# Patient Record
Sex: MALE | Race: ASIAN | NOT HISPANIC OR LATINO | URBAN - METROPOLITAN AREA
[De-identification: names, ages, dates, MRNs, and addresses within clinical notes are randomized per-mention and may not be internally consistent; named-entity substitution may affect disease eponyms.]

---

## 2018-10-01 ENCOUNTER — OUTPATIENT (OUTPATIENT)
Dept: OUTPATIENT SERVICES | Facility: HOSPITAL | Age: 83
LOS: 1 days | End: 2018-10-01
Payer: MEDICARE

## 2018-10-01 DIAGNOSIS — Z95.1 PRESENCE OF AORTOCORONARY BYPASS GRAFT: Chronic | ICD-10-CM

## 2018-10-01 PROCEDURE — G9001: CPT

## 2018-10-25 ENCOUNTER — INPATIENT (INPATIENT)
Facility: HOSPITAL | Age: 83
LOS: 5 days | Discharge: HOSPICE | End: 2018-10-31
Attending: INTERNAL MEDICINE | Admitting: INTERNAL MEDICINE
Payer: MEDICARE

## 2018-10-25 VITALS
RESPIRATION RATE: 15 BRPM | TEMPERATURE: 98 F | HEIGHT: 60 IN | WEIGHT: 119.93 LBS | HEART RATE: 50 BPM | DIASTOLIC BLOOD PRESSURE: 92 MMHG | OXYGEN SATURATION: 90 % | SYSTOLIC BLOOD PRESSURE: 120 MMHG

## 2018-10-25 DIAGNOSIS — E78.5 HYPERLIPIDEMIA, UNSPECIFIED: ICD-10-CM

## 2018-10-25 DIAGNOSIS — E11.9 TYPE 2 DIABETES MELLITUS WITHOUT COMPLICATIONS: ICD-10-CM

## 2018-10-25 DIAGNOSIS — R62.7 ADULT FAILURE TO THRIVE: ICD-10-CM

## 2018-10-25 DIAGNOSIS — I10 ESSENTIAL (PRIMARY) HYPERTENSION: ICD-10-CM

## 2018-10-25 DIAGNOSIS — G20 PARKINSON'S DISEASE: ICD-10-CM

## 2018-10-25 DIAGNOSIS — Z95.1 PRESENCE OF AORTOCORONARY BYPASS GRAFT: Chronic | ICD-10-CM

## 2018-10-25 DIAGNOSIS — Z71.89 OTHER SPECIFIED COUNSELING: ICD-10-CM

## 2018-10-25 DIAGNOSIS — Z51.5 ENCOUNTER FOR PALLIATIVE CARE: ICD-10-CM

## 2018-10-25 LAB
ALBUMIN SERPL ELPH-MCNC: 3.7 G/DL — SIGNIFICANT CHANGE UP (ref 3.3–5)
ALP SERPL-CCNC: 62 U/L — SIGNIFICANT CHANGE UP (ref 40–120)
ALT FLD-CCNC: 38 U/L — SIGNIFICANT CHANGE UP (ref 12–78)
ANION GAP SERPL CALC-SCNC: 9 MMOL/L — SIGNIFICANT CHANGE UP (ref 5–17)
APPEARANCE UR: CLEAR — SIGNIFICANT CHANGE UP
APTT BLD: 28.8 SEC — SIGNIFICANT CHANGE UP (ref 27.5–37.4)
AST SERPL-CCNC: 42 U/L — HIGH (ref 15–37)
BACTERIA # UR AUTO: ABNORMAL
BASOPHILS # BLD AUTO: 0.02 K/UL — SIGNIFICANT CHANGE UP (ref 0–0.2)
BASOPHILS NFR BLD AUTO: 0.2 % — SIGNIFICANT CHANGE UP (ref 0–2)
BILIRUB SERPL-MCNC: 1.4 MG/DL — HIGH (ref 0.2–1.2)
BILIRUB UR-MCNC: NEGATIVE — SIGNIFICANT CHANGE UP
BUN SERPL-MCNC: 29 MG/DL — HIGH (ref 7–23)
CALCIUM SERPL-MCNC: 8.9 MG/DL — SIGNIFICANT CHANGE UP (ref 8.5–10.1)
CHLORIDE SERPL-SCNC: 103 MMOL/L — SIGNIFICANT CHANGE UP (ref 96–108)
CO2 SERPL-SCNC: 28 MMOL/L — SIGNIFICANT CHANGE UP (ref 22–31)
COLOR SPEC: YELLOW — SIGNIFICANT CHANGE UP
COMMENT - URINE: SIGNIFICANT CHANGE UP
CREAT SERPL-MCNC: 0.83 MG/DL — SIGNIFICANT CHANGE UP (ref 0.5–1.3)
DIFF PNL FLD: ABNORMAL
EOSINOPHIL # BLD AUTO: 0.11 K/UL — SIGNIFICANT CHANGE UP (ref 0–0.5)
EOSINOPHIL NFR BLD AUTO: 1 % — SIGNIFICANT CHANGE UP (ref 0–6)
EPI CELLS # UR: SIGNIFICANT CHANGE UP
GLUCOSE BLDC GLUCOMTR-MCNC: 129 MG/DL — HIGH (ref 70–99)
GLUCOSE BLDC GLUCOMTR-MCNC: 94 MG/DL — SIGNIFICANT CHANGE UP (ref 70–99)
GLUCOSE SERPL-MCNC: 127 MG/DL — HIGH (ref 70–99)
GLUCOSE UR QL: 50 MG/DL
HCT VFR BLD CALC: 38.3 % — LOW (ref 39–50)
HGB BLD-MCNC: 12.9 G/DL — LOW (ref 13–17)
HYALINE CASTS # UR AUTO: ABNORMAL /LPF
IMM GRANULOCYTES NFR BLD AUTO: 0.3 % — SIGNIFICANT CHANGE UP (ref 0–1.5)
INR BLD: 1.25 RATIO — HIGH (ref 0.88–1.16)
KETONES UR-MCNC: ABNORMAL
LEUKOCYTE ESTERASE UR-ACNC: ABNORMAL
LYMPHOCYTES # BLD AUTO: 1.49 K/UL — SIGNIFICANT CHANGE UP (ref 1–3.3)
LYMPHOCYTES # BLD AUTO: 14.2 % — SIGNIFICANT CHANGE UP (ref 13–44)
MCHC RBC-ENTMCNC: 32.2 PG — SIGNIFICANT CHANGE UP (ref 27–34)
MCHC RBC-ENTMCNC: 33.7 GM/DL — SIGNIFICANT CHANGE UP (ref 32–36)
MCV RBC AUTO: 95.5 FL — SIGNIFICANT CHANGE UP (ref 80–100)
MONOCYTES # BLD AUTO: 0.68 K/UL — SIGNIFICANT CHANGE UP (ref 0–0.9)
MONOCYTES NFR BLD AUTO: 6.5 % — SIGNIFICANT CHANGE UP (ref 2–14)
NEUTROPHILS # BLD AUTO: 8.16 K/UL — HIGH (ref 1.8–7.4)
NEUTROPHILS NFR BLD AUTO: 77.8 % — HIGH (ref 43–77)
NITRITE UR-MCNC: NEGATIVE — SIGNIFICANT CHANGE UP
NRBC # BLD: 0 /100 WBCS — SIGNIFICANT CHANGE UP (ref 0–0)
PH UR: 6 — SIGNIFICANT CHANGE UP (ref 5–8)
PLATELET # BLD AUTO: 287 K/UL — SIGNIFICANT CHANGE UP (ref 150–400)
POTASSIUM SERPL-MCNC: 4.2 MMOL/L — SIGNIFICANT CHANGE UP (ref 3.5–5.3)
POTASSIUM SERPL-SCNC: 4.2 MMOL/L — SIGNIFICANT CHANGE UP (ref 3.5–5.3)
PROT SERPL-MCNC: 8.8 GM/DL — HIGH (ref 6–8.3)
PROT UR-MCNC: 30 MG/DL
PROTHROM AB SERPL-ACNC: 13.7 SEC — HIGH (ref 9.8–12.7)
RBC # BLD: 4.01 M/UL — LOW (ref 4.2–5.8)
RBC # FLD: 14.3 % — SIGNIFICANT CHANGE UP (ref 10.3–14.5)
RBC CASTS # UR COMP ASSIST: ABNORMAL /HPF (ref 0–4)
SODIUM SERPL-SCNC: 140 MMOL/L — SIGNIFICANT CHANGE UP (ref 135–145)
SP GR SPEC: 1.02 — SIGNIFICANT CHANGE UP (ref 1.01–1.02)
TSH SERPL-MCNC: 1.22 UU/ML — SIGNIFICANT CHANGE UP (ref 0.36–3.74)
UROBILINOGEN FLD QL: 1 MG/DL
WBC # BLD: 10.49 K/UL — SIGNIFICANT CHANGE UP (ref 3.8–10.5)
WBC # FLD AUTO: 10.49 K/UL — SIGNIFICANT CHANGE UP (ref 3.8–10.5)
WBC UR QL: SIGNIFICANT CHANGE UP

## 2018-10-25 PROCEDURE — 99498 ADVNCD CARE PLAN ADDL 30 MIN: CPT

## 2018-10-25 PROCEDURE — 99223 1ST HOSP IP/OBS HIGH 75: CPT | Mod: AI

## 2018-10-25 PROCEDURE — 71045 X-RAY EXAM CHEST 1 VIEW: CPT | Mod: 26

## 2018-10-25 PROCEDURE — 99497 ADVNCD CARE PLAN 30 MIN: CPT

## 2018-10-25 PROCEDURE — 70450 CT HEAD/BRAIN W/O DYE: CPT | Mod: 26

## 2018-10-25 PROCEDURE — 93010 ELECTROCARDIOGRAM REPORT: CPT

## 2018-10-25 PROCEDURE — 99285 EMERGENCY DEPT VISIT HI MDM: CPT

## 2018-10-25 RX ORDER — METOPROLOL TARTRATE 50 MG
5 TABLET ORAL ONCE
Qty: 0 | Refills: 0 | Status: COMPLETED | OUTPATIENT
Start: 2018-10-25 | End: 2018-10-25

## 2018-10-25 RX ORDER — ENOXAPARIN SODIUM 100 MG/ML
40 INJECTION SUBCUTANEOUS EVERY 24 HOURS
Qty: 0 | Refills: 0 | Status: DISCONTINUED | OUTPATIENT
Start: 2018-10-25 | End: 2018-10-31

## 2018-10-25 RX ORDER — DEXTROSE 50 % IN WATER 50 %
15 SYRINGE (ML) INTRAVENOUS ONCE
Qty: 0 | Refills: 0 | Status: DISCONTINUED | OUTPATIENT
Start: 2018-10-25 | End: 2018-10-31

## 2018-10-25 RX ORDER — INSULIN LISPRO 100/ML
VIAL (ML) SUBCUTANEOUS
Qty: 0 | Refills: 0 | Status: DISCONTINUED | OUTPATIENT
Start: 2018-10-25 | End: 2018-10-30

## 2018-10-25 RX ORDER — DEXTROSE 50 % IN WATER 50 %
25 SYRINGE (ML) INTRAVENOUS ONCE
Qty: 0 | Refills: 0 | Status: DISCONTINUED | OUTPATIENT
Start: 2018-10-25 | End: 2018-10-31

## 2018-10-25 RX ORDER — SODIUM CHLORIDE 9 MG/ML
500 INJECTION INTRAMUSCULAR; INTRAVENOUS; SUBCUTANEOUS ONCE
Qty: 0 | Refills: 0 | Status: COMPLETED | OUTPATIENT
Start: 2018-10-25 | End: 2018-10-25

## 2018-10-25 RX ORDER — SODIUM CHLORIDE 9 MG/ML
1000 INJECTION, SOLUTION INTRAVENOUS
Qty: 0 | Refills: 0 | Status: DISCONTINUED | OUTPATIENT
Start: 2018-10-25 | End: 2018-10-31

## 2018-10-25 RX ORDER — METOPROLOL TARTRATE 50 MG
5 TABLET ORAL ONCE
Qty: 0 | Refills: 0 | Status: DISCONTINUED | OUTPATIENT
Start: 2018-10-25 | End: 2018-10-25

## 2018-10-25 RX ORDER — SODIUM CHLORIDE 9 MG/ML
1000 INJECTION, SOLUTION INTRAVENOUS
Qty: 0 | Refills: 0 | Status: DISCONTINUED | OUTPATIENT
Start: 2018-10-25 | End: 2018-10-26

## 2018-10-25 RX ORDER — GLUCAGON INJECTION, SOLUTION 0.5 MG/.1ML
1 INJECTION, SOLUTION SUBCUTANEOUS ONCE
Qty: 0 | Refills: 0 | Status: DISCONTINUED | OUTPATIENT
Start: 2018-10-25 | End: 2018-10-31

## 2018-10-25 RX ORDER — METOPROLOL TARTRATE 50 MG
5 TABLET ORAL EVERY 6 HOURS
Qty: 0 | Refills: 0 | Status: DISCONTINUED | OUTPATIENT
Start: 2018-10-25 | End: 2018-10-31

## 2018-10-25 RX ORDER — INFLUENZA VIRUS VACCINE 15; 15; 15; 15 UG/.5ML; UG/.5ML; UG/.5ML; UG/.5ML
0.5 SUSPENSION INTRAMUSCULAR ONCE
Qty: 0 | Refills: 0 | Status: COMPLETED | OUTPATIENT
Start: 2018-10-25 | End: 2018-10-31

## 2018-10-25 RX ORDER — SODIUM CHLORIDE 9 MG/ML
1000 INJECTION INTRAMUSCULAR; INTRAVENOUS; SUBCUTANEOUS
Qty: 0 | Refills: 0 | Status: DISCONTINUED | OUTPATIENT
Start: 2018-10-25 | End: 2018-10-25

## 2018-10-25 RX ORDER — DEXTROSE 50 % IN WATER 50 %
12.5 SYRINGE (ML) INTRAVENOUS ONCE
Qty: 0 | Refills: 0 | Status: DISCONTINUED | OUTPATIENT
Start: 2018-10-25 | End: 2018-10-31

## 2018-10-25 RX ORDER — INSULIN LISPRO 100/ML
VIAL (ML) SUBCUTANEOUS AT BEDTIME
Qty: 0 | Refills: 0 | Status: DISCONTINUED | OUTPATIENT
Start: 2018-10-25 | End: 2018-10-30

## 2018-10-25 RX ADMIN — ENOXAPARIN SODIUM 40 MILLIGRAM(S): 100 INJECTION SUBCUTANEOUS at 16:19

## 2018-10-25 RX ADMIN — SODIUM CHLORIDE 125 MILLILITER(S): 9 INJECTION INTRAMUSCULAR; INTRAVENOUS; SUBCUTANEOUS at 11:30

## 2018-10-25 RX ADMIN — SODIUM CHLORIDE 100 MILLILITER(S): 9 INJECTION, SOLUTION INTRAVENOUS at 16:19

## 2018-10-25 RX ADMIN — SODIUM CHLORIDE 16.67 MILLILITER(S): 9 INJECTION INTRAMUSCULAR; INTRAVENOUS; SUBCUTANEOUS at 11:16

## 2018-10-25 RX ADMIN — Medication 5 MILLIGRAM(S): at 13:59

## 2018-10-25 RX ADMIN — SODIUM CHLORIDE 500 MILLILITER(S): 9 INJECTION INTRAMUSCULAR; INTRAVENOUS; SUBCUTANEOUS at 11:20

## 2018-10-25 RX ADMIN — Medication 5 MILLIGRAM(S): at 16:19

## 2018-10-25 NOTE — CONSULT NOTE ADULT - CONSULT REASON
Goals of care conversation and advance care planning. Patient has Parkinson's disease and failure to thrive.

## 2018-10-25 NOTE — ED PROVIDER NOTE - MEDICAL DECISION MAKING DETAILS
Pt with failure to thrive will also benefit from medication adjustment as likely decompensating parkinsons causing FTT. d/w with family, no PEG tube, may consider hospice care. dr. aaliyah irizarry.

## 2018-10-25 NOTE — H&P ADULT - NSHPLABSRESULTS_GEN_ALL_CORE
< from: CT Head No Cont (10.25.18 @ 12:38) >    IMPRESSION:    1)  scattered chronic ischemic changes with mild to moderate volume loss.   No acute abnormality suggested..  2)  chronic inflammatory changes noted in the sinuses in the left   temporal bone.                            12.9   10.49 )-----------( 287      ( 25 Oct 2018 11:13 )             38.3     10-25    140  |  103  |  29<H>  ----------------------------<  127<H>  4.2   |  28  |  0.83    Ca    8.9      25 Oct 2018 11:13    TPro  8.8<H>  /  Alb  3.7  /  TBili  1.4<H>  /  DBili  x   /  AST  42<H>  /  ALT  38  /  AlkPhos  62  10-25

## 2018-10-25 NOTE — ED ADULT NURSE NOTE - INTERVENTIONS DEFINITIONS
Monitor for mental status changes and reorient to person, place, and time/Stretcher in lowest position, wheels locked, appropriate side rails in place/Physically safe environment: no spills, clutter or unnecessary equipment

## 2018-10-25 NOTE — H&P ADULT - ASSESSMENT
83m with parkinsons who the family wants only comfort care and no ngt - he has not eaten in four days and requires admssion for hospice eval       IMPROVE VTE Individual Risk Assessment        RISK                                                          Points  [  ] Previous VTE                                                3  [  ] Thrombophilia                                             2  [  ] Lower limb paralysis                                   2        (unable to hold up >15 seconds)    [  ] Current Cancer                                            2         (within 6 months)  [  x] Immobilization > 24 hrs                              1  [  ] ICU/CCU stay > 24 hours                            1  [ x ] Age > 60                                                    1  IMPROVE VTE Score ___2______

## 2018-10-25 NOTE — H&P ADULT - HISTORY OF PRESENT ILLNESS
84yo male with pmh dementia, parkinsons, DM, HTN, HL, hypothyroid presents with failure to thrive. Pt has not been ambulating for 6 months, and slowly decompensating per family. For past 4 days unable to feed pt as he clenches his teeth. Pt barely speaks at baseline as difficult due to parkinsons but has not been talking at all. no fever, vomiting, pain, diarrhea, foul smelling urine, cough, sob, noted.

## 2018-10-25 NOTE — CONSULT NOTE ADULT - ASSESSMENT
Family meeting on: DATE: 10/25/18 Called  and as per the family member who responded stated that the family is on their way to the hospital. Will follow .  RECOMMENDATIONS: CONSIDER DNR/ DNI, COMFORT CARE, PAIN AND SYMPTOM MANAGEMENT WITH HOSPICE CARE. Family meeting on: DATE: 10/25/18 Called  and as per the family member who responded stated that the family is on their way to the hospital. 17:30 pm Met with wife Dianne Randolph and Daughter Thea Baltazar and discussed goals of care and advance care planning. As per daughter patient only wanted comfort measures only. She stated that she wanted to discuss with her family before make any decision. Hospice care explained and accepted but still wanted to discuss with the son before call for the evaluation.  RECOMMENDATIONS: CONSIDER DNR/ DNI, COMFORT CARE, PAIN AND SYMPTOM MANAGEMENT WITH HOSPICE CARE.

## 2018-10-25 NOTE — ED PROVIDER NOTE - OBJECTIVE STATEMENT
82yo male with pmh dementia, parkinsons, DM, HTN, HL, hypothyroid presents with failure to thrive. Pt has not been ambulating for 6 months, and slowly decompensating per family. For past 4 days unable to feed pt as he clenches his teeth. Pt barely speaks at baseline as difficult due to parkinsons but has not been talking at all. no fever, vomiting, pain, diarrhea, foul smelling urine, cough, sob, noted.    home doctor: dr joey goncalves, dr munson, neuro: dr anoop rojas

## 2018-10-25 NOTE — CONSULT NOTE ADULT - ASSESSMENT
Multiple medical problems as outlined  Progressive dementia, depression, Parkinson's disease  weight loss, moderate protein calorie malnutrition  No acute CV issues

## 2018-10-25 NOTE — H&P ADULT - PMH
CAD (coronary artery disease)    Depressed    DM (diabetes mellitus)    HLD (hyperlipidemia)    HTN (hypertension)    Macular degeneration

## 2018-10-25 NOTE — CONSULT NOTE ADULT - SUBJECTIVE AND OBJECTIVE BOX
83 year old male well known to me for many years with h/o CAD, s/p CABG. No h/o CHF or recent CV events.  Has had poorly controlled DM, hypertension, hyperlipidemia and hypothyroidism.    He developed PD and has had progressive dementia and severe depression, s/p ECT.  Currently with diminished oral intake and declining level of function/consciousness.      	    ALLERGIES AND HOME MEDICATIONS:   Allergies:        Allergies:  	No Known Allergies:     Home Medications:   * Patient Currently Takes Medications as of 17-Jun-2016 16:38 documented in Structured Notes  · 	enoxaparin: 40 unit(s) subcutaneously once a day  · 	carbidopa-levodopa 25 mg-250 mg oral tablet: 1 tab(s) orally 3 times a day  · 	bisacodyl 10 mg rectal suppository: 1 suppository(ies) rectal every 48 hours, As needed, Constipation  · 	LSO Brace: Dx: L1 Burst Fracture  	Length of need: 99 days  · 	levothyroxine 50 mcg (0.05 mg) oral tablet: 1 tab(s) orally once a day  · 	buPROPion 75 mg oral tablet: 1 tab(s) orally once a day  · 	Cymbalta 20 mg oral delayed release capsule: 1 cap(s) orally once a day  · 	Vitamin B Complex 100:  injectable once a day  · 	Januvia 100 mg oral tablet: 1 tab(s) orally once a day  · 	losartan 25 mg oral tablet: 1 tab(s) orally once a day  · 	Metoprolol Succinate ER 25 mg oral tablet, extended release: 1 tab(s) orally once a day  · 	Crestor 10 mg oral tablet: 1 tab(s) orally once a day (at bedtime)  · 	Ecotrin 325 mg oral delayed release tablet: 1 tab(s) orally once a day  · 	Ocuvite oral tablet: 1 tab(s) orally once a day  · 	glyBURIDE-metFORMIN 5 mg-500 mg oral tablet: 1 tab(s) orally 2 times a day  · 	tamsulosin 0.4 mg oral capsule: 1 cap(s) orally every other day  · 	finasteride 5 mg oral tablet: 1 tab(s) orally once a day    REVIEW OF SYSTEMS:   Review of Systems:  · UNABLE TO OBTAIN: Dementia	    PHYSICAL EXAM:   VS per EHR Skin warm, dry alerts and turns head to verbal stimuli noncommunicative skin turgor poor sclera anicter, conj nl jvp normal lungs clear no S3, rub, soft apical flow murmur, normal S1S2 abdomen benign no edema posturing noted	  LABS    Complete Blood Count + Automated Diff (10.25.18 @ 11:13)    WBC Count: 10.49 K/uL    RBC Count: 4.01 M/uL    Hemoglobin: 12.9 g/dL    Hematocrit: 38.3 %    Mean Cell Volume: 95.5 fl    Mean Cell Hemoglobin: 32.2 pg    Mean Cell Hemoglobin Conc: 33.7 gm/dL    Red Cell Distrib Width: 14.3 %    Platelet Count - Automated: 287 K/uL    Auto Neutrophil #: 8.16 K/uL    Auto Lymphocyte #: 1.49 K/uL    Auto Monocyte #: 0.68 K/uL    Auto Eosinophil #: 0.11 K/uL    Auto Basophil #: 0.02 K/uL    Auto Neutrophil %: 77.8: Differential percentages must be correlated with absolute numbers for  clinical significance. %    Auto Lymphocyte %: 14.2 %    Auto Monocyte %: 6.5 %    Auto Eosinophil %: 1.0 %    Auto Basophil %: 0.2 %    Auto Immature Granulocyte %: 0.3 %    Comprehensive Metabolic Panel (10.25.18 @ 11:13)    Sodium, Serum: 140 mmol/L    Potassium, Serum: 4.2 mmol/L    Chloride, Serum: 103 mmol/L    Carbon Dioxide, Serum: 28 mmol/L    Anion Gap, Serum: 9 mmol/L    Blood Urea Nitrogen, Serum: 29 mg/dL    Creatinine, Serum: 0.83 mg/dL    Glucose, Serum: 127 mg/dL    Calcium, Total Serum: 8.9 mg/dL    Protein Total, Serum: 8.8 gm/dL    Albumin, Serum: 3.7 g/dL    Bilirubin Total, Serum: 1.4 mg/dL    Alkaline Phosphatase, Serum: 62 U/L    Aspartate Aminotransferase (AST/SGOT): 42 U/L    Alanine Aminotransferase (ALT/SGPT): 38 U/L    eGFR if Non : 82: Interpretative comment  The units for eGFR are ml/min/1.73m2 (normalized body surface area). The  eGFR is calculated from a serum creatinine using the CKD-EPI equation.  Other variables required for calculation are race, age and sex. Among  patients with chronic kidney disease (CKD), the eGFR is useful in  determining the stage of disease according to KDOQI CKD classification.  All eGFR results are reported numerically with the following  interpretation.          GFR                    With                 Without     (ml/min/1.73 m2)    Kidney Damage       Kidney Damage        >= 90                    Stage 1                     Normal        60-89                    Stage 2                     Decreased GFR        30-59     Stage 3                     Stage 3        15-29                    Stage 4                     Stage 4        < 15                      Stage 5                     Stage 5  Each stage of CKD assumes that the associated GFR level has been in  effect for at least 3 months. Determination of stages one and two (with  eGFR > 59 ml/min/m2) requires estimation of kidney damage for at least 3  months as defined by structural or functional abnormalities.  Limitations: All estimates of GFR will be less accurate for patients at  extremes of muscle mass (including but not limited to frail elderly,  critically ill, or cancer patients), those with unusual diets, and those  with conditions associated with reduced secretion or extrarenal  elimination of creatinine. The eGFR equation is not recommended for use  in patients with unstable creatinine levels. mL/min/1.73M2    eGFR if African American: 95 mL/min/1.73M2

## 2018-10-25 NOTE — ED ADULT TRIAGE NOTE - CHIEF COMPLAINT QUOTE
ems states, " Family states he has not been eating or drinking for 3 days, he has parkinson's and dementia ", sent from [facility]

## 2018-10-25 NOTE — CONSULT NOTE ADULT - SUBJECTIVE AND OBJECTIVE BOX
HPI:  82yo male with pmh dementia, parkinsons, DM, HTN, HL, hypothyroid presents with failure to thrive. Pt has not been ambulating for 6 months, and slowly decompensating per family. For past 4 days unable to feed pt as he clenches his teeth. Pt barely speaks at baseline as difficult due to parkinsons but has not been talking at all. no fever, vomiting, pain, diarrhea, foul smelling urine, cough, sob, noted. Palliative care consult called for Goals of care conversation and advance care planning. Patient has Parkinson's disease and failure to thrive.    PERTINENT PMH REVIEWED:  [x ] YES [ ] NO           SOCIAL HISTORY:  Significant other/partner:  [ ] YES  [ ] NO            Children:  [x ] YES  [ ] NO                   Caodaism/Spirituality:  Substance hx:  [ ] YES   [ ] NO           Tobacco hx:  [ ] YES  [ ] NO             Alcohol hx: [ ] YES  [ ] NO        Home Opioid hx:  [ ] YES  [ ] NO   Living Situation: [x ] Home  [ ] Long term care  [ ] Rehab    FAMILY HISTORY:  No pertinent family history in first degree relatives    [x ] Family history non contributory     BASELINE ADLs (prior to admission):  Independent [ ] moderately [ ] fully   Dependent   [ ] moderately [x ] fully    Code Status:                      MOLST  [ ] YES [ ] NO    Living Will  [ ] YES [ ] NO    Health Care Proxy [ ] YES  [ ] NO      [ ] Surrogate  [ ] HCP  [ ] Guardian:                                                                  Phone#:    Allergies    No Known Allergies    Intolerances        MEDICATIONS  (STANDING):  dextrose 5% + sodium chloride 0.9%. 1000 milliLiter(s) (100 mL/Hr) IV Continuous <Continuous>  dextrose 5%. 1000 milliLiter(s) (50 mL/Hr) IV Continuous <Continuous>  dextrose 50% Injectable 12.5 Gram(s) IV Push once  dextrose 50% Injectable 25 Gram(s) IV Push once  dextrose 50% Injectable 25 Gram(s) IV Push once  enoxaparin Injectable 40 milliGRAM(s) SubCutaneous every 24 hours  influenza   Vaccine 0.5 milliLiter(s) IntraMuscular once  insulin lispro (HumaLOG) corrective regimen sliding scale   SubCutaneous three times a day before meals  insulin lispro (HumaLOG) corrective regimen sliding scale   SubCutaneous at bedtime  metoprolol tartrate Injectable 5 milliGRAM(s) IV Push every 6 hours    MEDICATIONS  (PRN):  dextrose 40% Gel 15 Gram(s) Oral once PRN Blood Glucose LESS THAN 70 milliGRAM(s)/deciliter  glucagon  Injectable 1 milliGRAM(s) IntraMuscular once PRN Glucose LESS THAN 70 milligrams/deciliter      PRESENT SYMPTOMS:  Source: [ ] Patient   [ ] Family   [ ] Team     Pain: [ ] YES [ ] NO  Onset:                    Location:                          Duration:                 Character:            Aggravating factors:                        Relieving factors:    Radiation:              Timing:                             Severity:      Dyspnea: [ ] YES [ ] NO - Mild [ ]  Moderate [ ]  Severe [ ]    Anxiety: [ ] YES [ ] NO  Fatigue: [ ] YES [ ] NO   Nausea: [ ] YES [ ] NO  Loss of appetite: [ ] YES [ ] NO   Constipation: [ ] YES [ ] NO     Other Symptoms:  [ ] All other review of systems negative   [ ] Unable to obtain due to poor mentation     Does patient meet criteria for Severe Protein Calorie Malnutrition?  Yes [ ]  No [ ]  PPSV 30% or below [ ]  Anasarca [ ]  Albumin < 2 [ ] Catabolic State [ ] Poor nutritional intake [ ] Significant weight loss [ ]      Palliative Performance Status Version 2:         %  ECOG -        Vital Signs Last 24 Hrs  T(C): 36.7 (25 Oct 2018 15:48), Max: 37.1 (25 Oct 2018 14:39)  T(F): 98.1 (25 Oct 2018 15:48), Max: 98.8 (25 Oct 2018 14:39)  HR: 87 (25 Oct 2018 15:48) (50 - 99)  BP: 192/101 (25 Oct 2018 15:48) (120/92 - 192/101)  BP(mean): --  RR: 19 (25 Oct 2018 15:48) (15 - 22)  SpO2: 98% (25 Oct 2018 15:48) (90% - 100%)    Physical Exam:    General: [ ] Alert,  A&O x     [ ] lethargic   [ ] Agitated   [ ] Cachexia   HEENT: [ ] Normal   [ ] Dry mouth   [ ] ET Tube    [ ] Trach   Lungs: [ ] Clear [ ] Rhonchi  [ ] Crackles [ ] Wheezing [ ] Tachypnea  [ ] Audible excessive secretions    Cardiovascular:  [ ] Regular rate and rhythm  [ ] Irregular [ ] Tachycardia   [ ] Bradycardia   Abdomen: [ ] Soft  [ ] Distended  [ ] +BS  [ ] Non tender [ ] Tender  [ ]PEG   [ ] NGT   Last BM:     Genitourinary: [ ] Normal [ ] Incontinent   [ ] Oliguria/Anuria   [ ] Jarvis  Musculoskeletal:  [ ] Normal   [ ] Generalized weakness  [ ] Bedbound  [ ] Edema   Neurological: [ ] No focal deficits  [ ] Cognitive impairment     Skin: [ ] Normal   [ ] Pressure ulcers     LABS:                        12.9   10.49 )-----------( 287      ( 25 Oct 2018 11:13 )             38.3     10    140  |  103  |  29<H>  ----------------------------<  127<H>  4.2   |  28  |  0.83    Ca    8.9      25 Oct 2018 11:13    TPro  8.8<H>  /  Alb  3.7  /  TBili  1.4<H>  /  DBili  x   /  AST  42<H>  /  ALT  38  /  AlkPhos  62  10    PT/INR - ( 25 Oct 2018 11:48 )   PT: 13.7 sec;   INR: 1.25 ratio         PTT - ( 25 Oct 2018 11:48 )  PTT:28.8 sec  Urinalysis Basic - ( 25 Oct 2018 11:32 )    Color: Yellow / Appearance: Clear / S.020 / pH: x  Gluc: x / Ketone: Moderate  / Bili: Negative / Urobili: 1 mg/dL   Blood: x / Protein: 30 mg/dL / Nitrite: Negative   Leuk Esterase: Trace / RBC: 3-5 /HPF / WBC 0-2   Sq Epi: x / Non Sq Epi: Occasional / Bacteria: Occasional      I&O's Summary      RADIOLOGY & ADDITIONAL STUDIES:    Referrals:  Hospice [ ]   Chaplaincy [ ]    Child Life [ ]   Social Work [ ]   Case Management [ ]   Holistic Therapy [ ] HPI:  82yo male with pmh dementia, parkinsons, DM, HTN, HL, hypothyroid presents with failure to thrive. Pt has not been ambulating for 6 months, and slowly decompensating per family. For past 4 days unable to feed pt as he clenches his teeth. Pt barely speaks at baseline as difficult due to parkinsons but has not been talking at all. no fever, vomiting, pain, diarrhea, foul smelling urine, cough, sob, noted. Palliative care consult called for Goals of care conversation and advance care planning. Patient has Parkinson's disease and failure to thrive.    PERTINENT PMH REVIEWED:  [x ] YES [ ] NO           SOCIAL HISTORY:  Significant other/partner:  [ ] YES  [ ] NO            Children:  [x ] YES  [ ] NO                   Denominational/Spirituality:  Substance hx:  [ ] YES   [ ] NO           Tobacco hx:  [ ] YES  [ ] NO             Alcohol hx: [ ] YES  [ ] NO        Home Opioid hx:  [ ] YES  [ ] NO   Living Situation: [x ] Home  [ ] Long term care  [ ] Rehab    FAMILY HISTORY:  No pertinent family history in first degree relatives    [x ] Family history non contributory     BASELINE ADLs (prior to admission):  Independent [ ] moderately [ ] fully   Dependent   [ ] moderately [x ] fully    Code Status: FULL CODE                     MOLST  [ ] YES [X ] NO    Living Will  [ ] YES [X ] NO    Health Care Proxy [ ] YES  [X ] NO      [ ] Surrogate  [ ] HCP  [ ] Guardian:  Tomy Randolph (son) / Thea Baltazar (daughter)    Dianne Randolph (wife/ HCP)[phone]                                                                    Allergies  No Known Allergies    Intolerances    MEDICATIONS  (STANDING):  dextrose 5% + sodium chloride 0.9%. 1000 milliLiter(s) (100 mL/Hr) IV Continuous <Continuous>  dextrose 5%. 1000 milliLiter(s) (50 mL/Hr) IV Continuous <Continuous>  dextrose 50% Injectable 12.5 Gram(s) IV Push once  dextrose 50% Injectable 25 Gram(s) IV Push once  dextrose 50% Injectable 25 Gram(s) IV Push once  enoxaparin Injectable 40 milliGRAM(s) SubCutaneous every 24 hours  influenza   Vaccine 0.5 milliLiter(s) IntraMuscular once  insulin lispro (HumaLOG) corrective regimen sliding scale   SubCutaneous three times a day before meals  insulin lispro (HumaLOG) corrective regimen sliding scale   SubCutaneous at bedtime  metoprolol tartrate Injectable 5 milliGRAM(s) IV Push every 6 hours    MEDICATIONS  (PRN):  dextrose 40% Gel 15 Gram(s) Oral once PRN Blood Glucose LESS THAN 70 milliGRAM(s)/deciliter  glucagon  Injectable 1 milliGRAM(s) IntraMuscular once PRN Glucose LESS THAN 70 milligrams/deciliter    PRESENT SYMPTOMS:  Source: [ ] Patient   [X ] Family   [ ] Team     Pain: [ ] YES [X] NO  Onset:                    Location:                          Duration:                 Character:            Aggravating factors:                        Relieving factors:    Radiation:              Timing:                             Severity:      Dyspnea: [ ] YES [X ] NO - Mild [ ]  Moderate [ ]  Severe [ ]    Anxiety: [ ] YES [X ] NO  Fatigue: [X ] YES [ ] NO   Nausea: [ ] YES [X ] NO  Loss of appetite: [X ] YES [ ] NO   Constipation: [X ] YES [ ] NO     Other Symptoms:  [ ] All other review of systems negative   [X ] Unable to obtain due to poor mentation     Does patient meet criteria for Severe Protein Calorie Malnutrition?  Yes [ ]  No [X ]  PPSV 30% or below [X ]  Anasarca [ ]  Albumin < 2 [ ] Catabolic State [ ] Poor nutritional intake [X ] Significant weight loss [ ]      Palliative Performance Status Version 2:  40 %  ECOG - 4    Vital Signs Last 24 Hrs  T(C): 36.7 (25 Oct 2018 15:48), Max: 37.1 (25 Oct 2018 14:39)  T(F): 98.1 (25 Oct 2018 15:48), Max: 98.8 (25 Oct 2018 14:39)  HR: 87 (25 Oct 2018 15:48) (50 - 99)  BP: 192/101 (25 Oct 2018 15:48) (120/92 - 192/101)  BP(mean): --  RR: 19 (25 Oct 2018 15:48) (15 - 22)  SpO2: 98% (25 Oct 2018 15:48) (90% - 100%)    Physical Exam:    General: [X] Alert,  A&O x     [X ] lethargic   [ ] Agitated   [ ] Cachexia   HEENT: [ ] Normal   [X ] Dry mouth   [ ] ET Tube    [ ] Trach   Lungs: [ ] Clear [X ] Rhonchi  [ ] Crackles [ ] Wheezing [ ] Tachypnea  [ ] Audible excessive secretions    Cardiovascular:  [ ] Regular rate and rhythm  [ ] Irregular [X ] Tachycardia   [ ] Bradycardia   Abdomen: [X ] Soft  [ ] Distended  [X ] +BS  [X ] Non tender [ ] Tender  [ ]PEG   [ ] NGT   Last BM:     Genitourinary: [ ] Normal [X ] Incontinent   [ ] Oliguria/Anuria   [ ] Jarvis  Musculoskeletal:  [ ] Normal   [ ] Generalized weakness  [X ] Bedbound  [ ] Edema   Neurological: [ ] No focal deficits  [ X] Cognitive impairment     Skin: [X ] Normal   [ ] Pressure ulcers     LABS:                        12.9   10.49 )-----------( 287      ( 25 Oct 2018 11:13 )             38.3   10    140  |  103  |  29<H>  ----------------------------<  127<H>  4.2   |  28  |  0.83    Ca    8.9      25 Oct 2018 11:13    TPro  8.8<H>  /  Alb  3.7  /  TBili  1.4<H>  /  DBili  x   /  AST  42<H>  /  ALT  38  /  AlkPhos  62  10    PT/INR - ( 25 Oct 2018 11:48 )   PT: 13.7 sec;   INR: 1.25 ratio       PTT - ( 25 Oct 2018 11:48 )  PTT:28.8 sec  Urinalysis Basic - ( 25 Oct 2018 11:32 )    Color: Yellow / Appearance: Clear / S.020 / pH: x  Gluc: x / Ketone: Moderate  / Bili: Negative / Urobili: 1 mg/dL   Blood: x / Protein: 30 mg/dL / Nitrite: Negative   Leuk Esterase: Trace / RBC: 3-5 /HPF / WBC 0-2   Sq Epi: x / Non Sq Epi: Occasional / Bacteria: Occasional    I&O's Summary    RADIOLOGY & ADDITIONAL STUDIES:    Referrals:  Hospice [X ]   Chaplaincy [ ]    Child Life [ ]   Social Work [X ]   Case Management [ ]   Holistic Therapy [ ]

## 2018-10-26 DIAGNOSIS — Z29.9 ENCOUNTER FOR PROPHYLACTIC MEASURES, UNSPECIFIED: ICD-10-CM

## 2018-10-26 DIAGNOSIS — N30.01 ACUTE CYSTITIS WITH HEMATURIA: ICD-10-CM

## 2018-10-26 DIAGNOSIS — G93.40 ENCEPHALOPATHY, UNSPECIFIED: ICD-10-CM

## 2018-10-26 DIAGNOSIS — E03.9 HYPOTHYROIDISM, UNSPECIFIED: ICD-10-CM

## 2018-10-26 LAB
ANION GAP SERPL CALC-SCNC: 7 MMOL/L — SIGNIFICANT CHANGE UP (ref 5–17)
BUN SERPL-MCNC: 15 MG/DL — SIGNIFICANT CHANGE UP (ref 7–23)
CALCIUM SERPL-MCNC: 7.5 MG/DL — LOW (ref 8.5–10.1)
CHLORIDE SERPL-SCNC: 108 MMOL/L — SIGNIFICANT CHANGE UP (ref 96–108)
CO2 SERPL-SCNC: 27 MMOL/L — SIGNIFICANT CHANGE UP (ref 22–31)
CREAT SERPL-MCNC: 0.61 MG/DL — SIGNIFICANT CHANGE UP (ref 0.5–1.3)
CULTURE RESULTS: NO GROWTH — SIGNIFICANT CHANGE UP
GLUCOSE BLDC GLUCOMTR-MCNC: 116 MG/DL — HIGH (ref 70–99)
GLUCOSE BLDC GLUCOMTR-MCNC: 126 MG/DL — HIGH (ref 70–99)
GLUCOSE BLDC GLUCOMTR-MCNC: 130 MG/DL — HIGH (ref 70–99)
GLUCOSE BLDC GLUCOMTR-MCNC: 139 MG/DL — HIGH (ref 70–99)
GLUCOSE BLDC GLUCOMTR-MCNC: 146 MG/DL — HIGH (ref 70–99)
GLUCOSE SERPL-MCNC: 138 MG/DL — HIGH (ref 70–99)
HBA1C BLD-MCNC: 5.6 % — SIGNIFICANT CHANGE UP (ref 4–5.6)
HCT VFR BLD CALC: 35 % — LOW (ref 39–50)
HGB BLD-MCNC: 11.7 G/DL — LOW (ref 13–17)
MCHC RBC-ENTMCNC: 32 PG — SIGNIFICANT CHANGE UP (ref 27–34)
MCHC RBC-ENTMCNC: 33.4 GM/DL — SIGNIFICANT CHANGE UP (ref 32–36)
MCV RBC AUTO: 95.6 FL — SIGNIFICANT CHANGE UP (ref 80–100)
NRBC # BLD: 0 /100 WBCS — SIGNIFICANT CHANGE UP (ref 0–0)
PLATELET # BLD AUTO: 269 K/UL — SIGNIFICANT CHANGE UP (ref 150–400)
POTASSIUM SERPL-MCNC: 3.4 MMOL/L — LOW (ref 3.5–5.3)
POTASSIUM SERPL-SCNC: 3.4 MMOL/L — LOW (ref 3.5–5.3)
RBC # BLD: 3.66 M/UL — LOW (ref 4.2–5.8)
RBC # FLD: 14.2 % — SIGNIFICANT CHANGE UP (ref 10.3–14.5)
SODIUM SERPL-SCNC: 142 MMOL/L — SIGNIFICANT CHANGE UP (ref 135–145)
SPECIMEN SOURCE: SIGNIFICANT CHANGE UP
WBC # BLD: 8.38 K/UL — SIGNIFICANT CHANGE UP (ref 3.8–10.5)
WBC # FLD AUTO: 8.38 K/UL — SIGNIFICANT CHANGE UP (ref 3.8–10.5)

## 2018-10-26 PROCEDURE — 99233 SBSQ HOSP IP/OBS HIGH 50: CPT

## 2018-10-26 RX ORDER — FINASTERIDE 5 MG/1
5 TABLET, FILM COATED ORAL DAILY
Qty: 0 | Refills: 0 | Status: DISCONTINUED | OUTPATIENT
Start: 2018-10-26 | End: 2018-10-31

## 2018-10-26 RX ORDER — SODIUM CHLORIDE 9 MG/ML
1000 INJECTION, SOLUTION INTRAVENOUS
Qty: 0 | Refills: 0 | Status: DISCONTINUED | OUTPATIENT
Start: 2018-10-26 | End: 2018-10-28

## 2018-10-26 RX ORDER — DULOXETINE HYDROCHLORIDE 30 MG/1
20 CAPSULE, DELAYED RELEASE ORAL DAILY
Qty: 0 | Refills: 0 | Status: DISCONTINUED | OUTPATIENT
Start: 2018-10-26 | End: 2018-10-31

## 2018-10-26 RX ORDER — CEFTRIAXONE 500 MG/1
1 INJECTION, POWDER, FOR SOLUTION INTRAMUSCULAR; INTRAVENOUS ONCE
Qty: 0 | Refills: 0 | Status: COMPLETED | OUTPATIENT
Start: 2018-10-26 | End: 2018-10-26

## 2018-10-26 RX ORDER — LEVOTHYROXINE SODIUM 125 MCG
75 TABLET ORAL DAILY
Qty: 0 | Refills: 0 | Status: DISCONTINUED | OUTPATIENT
Start: 2018-10-26 | End: 2018-10-31

## 2018-10-26 RX ORDER — CARBIDOPA AND LEVODOPA 25; 100 MG/1; MG/1
1 TABLET ORAL THREE TIMES A DAY
Qty: 0 | Refills: 0 | Status: DISCONTINUED | OUTPATIENT
Start: 2018-10-26 | End: 2018-10-31

## 2018-10-26 RX ORDER — CEFTRIAXONE 500 MG/1
1 INJECTION, POWDER, FOR SOLUTION INTRAMUSCULAR; INTRAVENOUS EVERY 24 HOURS
Qty: 0 | Refills: 0 | Status: DISCONTINUED | OUTPATIENT
Start: 2018-10-27 | End: 2018-10-28

## 2018-10-26 RX ORDER — CEFTRIAXONE 500 MG/1
INJECTION, POWDER, FOR SOLUTION INTRAMUSCULAR; INTRAVENOUS
Qty: 0 | Refills: 0 | Status: DISCONTINUED | OUTPATIENT
Start: 2018-10-26 | End: 2018-10-28

## 2018-10-26 RX ORDER — BUPROPION HYDROCHLORIDE 150 MG/1
75 TABLET, EXTENDED RELEASE ORAL DAILY
Qty: 0 | Refills: 0 | Status: DISCONTINUED | OUTPATIENT
Start: 2018-10-26 | End: 2018-10-26

## 2018-10-26 RX ORDER — BUPROPION HYDROCHLORIDE 150 MG/1
75 TABLET, EXTENDED RELEASE ORAL DAILY
Qty: 0 | Refills: 0 | Status: DISCONTINUED | OUTPATIENT
Start: 2018-10-26 | End: 2018-10-31

## 2018-10-26 RX ADMIN — Medication 5 MILLIGRAM(S): at 17:13

## 2018-10-26 RX ADMIN — Medication 5 MILLIGRAM(S): at 06:38

## 2018-10-26 RX ADMIN — Medication 5 MILLIGRAM(S): at 11:43

## 2018-10-26 RX ADMIN — ENOXAPARIN SODIUM 40 MILLIGRAM(S): 100 INJECTION SUBCUTANEOUS at 16:51

## 2018-10-26 RX ADMIN — SODIUM CHLORIDE 100 MILLILITER(S): 9 INJECTION, SOLUTION INTRAVENOUS at 11:43

## 2018-10-26 RX ADMIN — Medication 5 MILLIGRAM(S): at 00:18

## 2018-10-26 RX ADMIN — SODIUM CHLORIDE 50 MILLILITER(S): 9 INJECTION, SOLUTION INTRAVENOUS at 15:44

## 2018-10-26 RX ADMIN — SODIUM CHLORIDE 100 MILLILITER(S): 9 INJECTION, SOLUTION INTRAVENOUS at 06:38

## 2018-10-26 RX ADMIN — CEFTRIAXONE 100 GRAM(S): 500 INJECTION, POWDER, FOR SOLUTION INTRAMUSCULAR; INTRAVENOUS at 18:07

## 2018-10-26 NOTE — PROGRESS NOTE ADULT - PROBLEM SELECTOR PLAN 1
intravenous hydration until hospice eval done Head CT negative, TSH wnl, vitamin B12, most likely secondary to progressive Dementia. Head CT negative, TSH wnl, vitamin B12, no significant infection, most likely secondary to progressive Dementia.

## 2018-10-26 NOTE — SWALLOW BEDSIDE ASSESSMENT ADULT - SWALLOW EVAL: RECOMMENDED FEEDING/EATING TECHNIQUES
small sips/bites/crush medication (when feasible)/maintain upright posture during/after eating for 30 mins/allow for swallow between intakes/liquids via teaspoon

## 2018-10-26 NOTE — SWALLOW BEDSIDE ASSESSMENT ADULT - H & P REVIEW
84yo male with pmh dementia, parkinsons, DM, HTN, HL, hypothyroid presents with failure to thrive. Pt has not been ambulating for 6 months, and slowly decompensating per family. For past 4 days unable to feed pt as he clenches his teeth. Pt barely speaks at baseline as difficult due to parkinsons but has not been talking at all. no fever, vomiting, pain, diarrhea, foul smelling urine, cough, sob, noted./yes

## 2018-10-26 NOTE — PROGRESS NOTE ADULT - SUBJECTIVE AND OBJECTIVE BOX
Late entry  INTERVAL HISTORY:  unresponsive   no events overnight    PAST MEDICAL & SURGICAL HISTORY:  Parkinson's disease  depression  Macular degeneration  CAD (coronary artery disease)  HLD (hyperlipidemia)  HTN (hypertension)  DM (diabetes mellitus)  S/P CABG x 3        MEDICATIONS:  MEDICATIONS  (STANDING):  dextrose 5% + sodium chloride 0.9%. 1000 milliLiter(s) (100 mL/Hr) IV Continuous <Continuous>  dextrose 5%. 1000 milliLiter(s) (50 mL/Hr) IV Continuous <Continuous>  dextrose 50% Injectable 12.5 Gram(s) IV Push once  dextrose 50% Injectable 25 Gram(s) IV Push once  dextrose 50% Injectable 25 Gram(s) IV Push once  enoxaparin Injectable 40 milliGRAM(s) SubCutaneous every 24 hours  influenza   Vaccine 0.5 milliLiter(s) IntraMuscular once  insulin lispro (HumaLOG) corrective regimen sliding scale   SubCutaneous three times a day before meals  insulin lispro (HumaLOG) corrective regimen sliding scale   SubCutaneous at bedtime  metoprolol tartrate Injectable 5 milliGRAM(s) IV Push every 6 hours    MEDICATIONS  (PRN):  dextrose 40% Gel 15 Gram(s) Oral once PRN Blood Glucose LESS THAN 70 milliGRAM(s)/deciliter  glucagon  Injectable 1 milliGRAM(s) IntraMuscular once PRN Glucose LESS THAN 70 milligrams/deciliter      PHYSICAL EXAM:  T(F): 97.2 (10-26-18 @ 05:24), Max: 98.8 (10-25-18 @ 14:39)  HR: 79 (10-26-18 @ 05:24) (76 - 97)  BP: 141/86 (10-26-18 @ 05:24) (141/86 - 192/101)  RR: 18 (10-26-18 @ 05:24) (17 - 22)  SpO2: 98% (10-26-18 @ 05:24) (97% - 100%)  Wt(kg): --  I&O's Summary      Weight (kg): 48.5 (10-25 @ 15:00)    PHYSICAL EXAM:    HEENT: sclera anicteric, conjunctiva normal  JVP flat  Carotids: normal upstroke  Lungs:  clear  Cor: normal S1S2, no S3, rub  Abdomen:  normal bs, nontender, nondistended, no organomegaly  Ext:  no edema                               11.7   8.38  )-----------( 269      ( 26 Oct 2018 07:24 )             35.0     10-26    142  |  108  |  15  ----------------------------<  138<H>  3.4<L>   |  27  |  0.61    Ca    7.5<L>      26 Oct 2018 07:24    TPro  8.8<H>  /  Alb  3.7  /  TBili  1.4<H>  /  DBili  x   /  AST  42<H>  /  ALT  38  /  AlkPhos  62  10-25      proBNP:   Lipid Profile:   HgA1c: Hemoglobin A1C, Whole Blood: 5.6 % (10-26 @ 10:06)    TSH:   Test                            Date  WBC         8.38                10-26 @ 07:24  RBC         3.66                10-26 @ 07:24  Hemoglobin  11.7                10-26 @ 07:24  Hematocrit  35.0                10-26 @ 07:24  Platelets   269                 10-26 @ 07:24  Creatinine  0.61                10-26 @ 07:24  Test                            Date  WBC         10.49               10-25 @ 11:13  RBC         4.01                10-25 @ 11:13  Hemoglobin  12.9                10-25 @ 11:13  Hematocrit  38.3                10-25 @ 11:13  Platelets   287                 10-25 @ 11:13  Creatinine  0.83                10-25 @ 11:13

## 2018-10-26 NOTE — CONSULT NOTE ADULT - SUBJECTIVE AND OBJECTIVE BOX
Subjective Complaints:  Historian:       Consult requested by ER doctor:                  Attending: DR RAUSCH    HPI:  82yo male with pmh dementia, parkinsons, DM, HTN, HL, hypothyroid presents with failure to thrive. Pt has not been ambulating for 6 months, and slowly decompensating per family. For past 4 days unable to feed pt as he clenches his teeth. Pt barely speaks at baseline as difficult due to parkinsons but has not been talking at all. no fever, vomiting, pain, diarrhea, foul smelling urine, cough, sob, noted. (25 Oct 2018 15:26)    MALCOM SEPULVEDA    PAST MEDICAL & SURGICAL HISTORY:  Depressed  Macular degeneration  CAD (coronary artery disease)  HLD (hyperlipidemia)  HTN (hypertension)  DM (diabetes mellitus)  S/P CABG x 3  83yMale    MEDICATIONS  (STANDING):  dextrose 5% + sodium chloride 0.9%. 1000 milliLiter(s) (100 mL/Hr) IV Continuous <Continuous>  dextrose 5%. 1000 milliLiter(s) (50 mL/Hr) IV Continuous <Continuous>  dextrose 50% Injectable 12.5 Gram(s) IV Push once  dextrose 50% Injectable 25 Gram(s) IV Push once  dextrose 50% Injectable 25 Gram(s) IV Push once  enoxaparin Injectable 40 milliGRAM(s) SubCutaneous every 24 hours  influenza   Vaccine 0.5 milliLiter(s) IntraMuscular once  insulin lispro (HumaLOG) corrective regimen sliding scale   SubCutaneous three times a day before meals  insulin lispro (HumaLOG) corrective regimen sliding scale   SubCutaneous at bedtime  metoprolol tartrate Injectable 5 milliGRAM(s) IV Push every 6 hours    MEDICATIONS  (PRN):  dextrose 40% Gel 15 Gram(s) Oral once PRN Blood Glucose LESS THAN 70 milliGRAM(s)/deciliter  glucagon  Injectable 1 milliGRAM(s) IntraMuscular once PRN Glucose LESS THAN 70 milligrams/deciliter      Allergies    No Known Allergies    Intolerances      FAMILY HISTORY:  No pertinent family history in first degree relatives      REVIEW OF SYSTEMS:  General:  No wt loss, fevers, chills, night sweats  Eyes:  Good vision, no reported pain  ENT:  No sore throat, pain, runny nose, dysphagia  CV:  No pain, palpitatioins, hypo/hypertension  Resp:  No dyspnea, cough, tachypnea, wheezing  GI:  No pain, nausea, vomiting, diarrhea, constipatiion  :  No pain, bleeding, incontinence, nocturia  Muscle:  No pain, weakness  Breast:  No pain, abscess, mass, discharge  Neuro:  No weakness, tingling, memory problems  Psych:  No fatigue, insomnia, mood problems, depression  Endocrine:  No polyuria, polydypsia, cold/heat intolerance  Heme:  No petechiae, ecchymosis, easy bruisability  Skin:  No rash, tattoos, scars, edema      Vital Signs Last 24 Hrs  T(C): 36.2 (26 Oct 2018 05:24), Max: 37.1 (25 Oct 2018 14:39)  T(F): 97.2 (26 Oct 2018 05:24), Max: 98.8 (25 Oct 2018 14:39)  HR: 79 (26 Oct 2018 05:24) (50 - 99)  BP: 141/86 (26 Oct 2018 05:24) (120/92 - 192/101)  BP(mean): --  RR: 18 (26 Oct 2018 05:24) (15 - 22)  SpO2: 98% (26 Oct 2018 05:24) (90% - 100%)    GENERAL PHYSICAL EXAM:  General:  Appears stated age, well-groomed, well-nourished, no distress  HEENT:  NC/AT, patent nares w/ pink mucosa, OP clear w/o lesions, PERRL, EOMI, conjunctivae clear, no thyromegaly, nodules, adenopathy, no JVD  Chest:  Full & symmetric excursion, no increased effort, breath sounds clear  Cardiovascular:  Regular rhythm, S1, S2, no murmur/rub/S3/S4, no carotid/femoral/abdominal bruit, radial/pedal pulses 2+, no edema  Abdomen:  Soft, non-tender, non-distended, normoactive bowel sounds, no HSM  Extremities:  Gait & station:   Digits:   Nails:   Joints, Bones, Muscles:   ROM:   Stability:  Skin:  No rash/erythema/ecchymoses/petechiae/wounds/abscess/warm/dry  Musculoskeletal:  Full ROM in all joints w/o swelling/tenderness/effusion    NEUROLOGICAL EXAM:  HENT:  Normocephalic head; atraumatic head.  Neck supple.  ENT: normal looking.  Mental State:    Awake ,non verbal unable to comprehend spoken language ,    Cranial Nerves:  II-XII:   Pupils round and reactive to light and accommodation. very rare extraocular movements ,swallowing reflexes are impaired no gag   Motor Functions:  Motor strength is 2/5   cogwheel rigidity ,no tremor  Sensory Functions:  no reaction to painful stimuli    Reflexes:  Deep tendon reflexes normoactive to biceps, knees and ankles. plantar responses are flexor   Cerebellar Testing:  unable to test   Gait Unable to stand      LABS:                        11.7   8.38  )-----------( 269      ( 26 Oct 2018 07:24 )             35.0     10-26    142  |  108  |  15  ----------------------------<  138<H>  3.4<L>   |  27  |  0.61    Ca    7.5<L>      26 Oct 2018 07:24    TPro  8.8<H>  /  Alb  3.7  /  TBili  1.4<H>  /  DBili  x   /  AST  42<H>  /  ALT  38  /  AlkPhos  62  10-25    PT/INR - ( 25 Oct 2018 11:48 )   PT: 13.7 sec;   INR: 1.25 ratio         PTT - ( 25 Oct 2018 11:48 )  PTT:28.8 sec    Urinalysis Basic - ( 25 Oct 2018 11:32 )    Color: Yellow / Appearance: Clear / S.020 / pH: x  Gluc: x / Ketone: Moderate  / Bili: Negative / Urobili: 1 mg/dL   Blood: x / Protein: 30 mg/dL / Nitrite: Negative   Leuk Esterase: Trace / RBC: 3-5 /HPF / WBC 0-2   Sq Epi: x / Non Sq Epi: Occasional / Bacteria: Occasional        RADIOLOGY & ADDITIONAL STUDIES:    POCT  Blood Glucose: 10:13 (10-25 @ 10:14)  Complete Blood Count + Automated Diff: STAT (10-25 @ 10:41)  Comprehensive Metabolic Panel: STAT (10-25 @ 10:41)  Prothrombin Time and INR, Plasma:  Start Date:25-Oct-2018. STAT  Cancel Reason: Hemolyzed Redraw (10-25 @ 10:41)  Activated Partial Thromboplastin Time:  Start Date:25-Oct-2018. STAT  Cancel Reason: Hemolyzed Redraw (10-25 @ 10:41)  Urinalysis: STAT (10-25 @ 10:41)  Culture - Urine: Routine  Specimen Source: Clean Catch (Midstream) (10-25 @ 10:41)  12 Lead ECG:   Provider's Contact #: [phone] (10-25 @ 10:41)  Xray Chest 1 View- PORTABLE-Urgent: Urgent   Indication: ftt  Transport: Portable  Exam Completed  Provider's Contact #: [phone] (10-25 @ 10:41)  Straight Cath for Residual Urine:     Time/Priority:  STAT (10-25 @ 10:41)  CT Head No Cont: Urgent   Indication: ftt  Transport: Stretcher-Crib  Exam Completed  Provider's Contact #: [phone] (10-25 @ 10:41)  sodium chloride 0.9% Bolus:   500 milliLiter(s), IV Bolus, once, infuse over 30 Hour(s), Stop After 1 Doses  Provider's Contact #: [phone] (10-25 @ 10:41)  sodium chloride 0.9%.: Solution, 1000 milliLiter(s) infuse at 125 mL/Hr  Provider's Contact #: [phone] (10-25 @ 10:41)  Thyroid Stimulating Hormone, Serum: STAT  Cancel Reason: -Lab Reordered (10-25 @ 10:49)  Nucleated RBC: 11:05 (10-25 @ 11:13)  Thyroid Stimulating Hormone, Serum: 11:05 (10-25 @ 11:24)  Activated Partial Thromboplastin Time:  Start Date:25-Oct-2018. STAT (10-25 @ 11:25)  Prothrombin Time and INR, Plasma:  Start Date:25-Oct-2018. STAT (10-25 @ 11:25)  Urine Microscopic-Add On (NC): 11:15 (10-25 @ 11:48)  metoprolol tartrate Injectable: [Known as LOPRESSOR Injectable]  5 milliGRAM(s), IV Push, Once, Stop After 1 Doses  Provider's Contact #: [phone] (10-25 @ 11:50)  metoprolol tartrate Injectable: [Known as LOPRESSOR Injectable]  5 milliGRAM(s), IV Push, once, Stop After 1 Doses  Provider's Contact #: [phone] (10-25 @ 13:25)  Admit to Inpatient Level of Care.:     Service:  MEDICAL/SURGICAL    Physician:  Theo Rausch    Additional Instructions:  Diagnosis: Failure to thrive in adult; Parkinsons  Isolation: None  Special Consideration: None (10-25 @ 13:26)  Provider to RN:       Patient cleared to transfer to medical floor (10-25 @ 13:42)  Vital Signs:     Frequency:  per routine (10-25 @ 13:42)  Admit from ED (10-25 @ 13:51)  (ADM OVERRIDE):   Qty Removed: 1 each  Route - Dose Given <see task> (10-25 @ 13:59)  Admit to Inpatient Level of Care.:     Service:  Medical/Surgical    Physician:  aaliyah (10-25 @ :29)  enoxaparin Injectable: [Known as LOVENOX]  40 milliGRAM(s), SubCutaneous, every 24 hours  Provider's Contact #: [phone] (10-25 @ :29)  Height/Length:     Frequency:  on admission (10-25 @ :29)  Weight:     Frequency:  on admission (10-25 @ 14:29)  Vital Signs:     Frequency:  every 8 hours (10-25 @ :)  Diet, NPO (10-25 @ 14:)  dextrose 5% + sodium chloride 0.9%.: Solution, 1000 milliLiter(s) infuse at 100 mL/Hr  Provider's Contact #: [phone] (10-25 @ 14:29)  Complete Blood Count: AM Sched. Collection: 26-Oct-2018 05:00 (10-25 @ 14:29)  Basic Metabolic Panel: AM Sched. Collection: 26-Oct-2018 05:00 (10-25 @ 14:29)  Consult- Palliative Care:    Reason for Consult: parkinsons and not eating   Team Name: Private MD (10-25 @ :32)  Hemoglobin A1C, Whole Blood: AM Sched. Collection: 26-Oct-2018 05:00 (10-25 @ 14:40)  Blood Glucose Point Of Care Testing:     Frequency:  every 6 hours    Additional Instructions:  If NPO (10-25 @ 14:40)  Blood Glucose Point Of Care Testing:     Frequency:  every 15 minutes    Additional Instructions:  After carbohydrate administration for hypoglycemia, repeat every 15 minute(s) until blood glucose is GREATER THAN or EQUAL  milliGRAM(s)/deciLiter twice consecutively (10-25 @ 14:40)  Notify Provider For:     Additional Instructions:  Blood glucose LESS THAN 70 milliGRAM(s)/deciLiter or GREATER THAN 400 milliGRAM(s)/deciLiter (10-25 @ 14:40)  Education:     Diabetes    Other: Diet, Exercise    Additional Instructions: Diet, Exercise, Diabetes (10-25 @ 14:40)  insulin lispro (HumaLOG) corrective regimen sliding scale:       1 Unit(s) if Glucose 151 - 200      2 Unit(s) if Glucose 201 - 250      3 Unit(s) if Glucose 251 - 300      4 Unit(s) if Glucose 301 - 350      5 Unit(s) if Glucose 351 - 400      6 Unit(s) if Glucose Greater Than 400 + Contact MD  SubCutaneous, three times a day before meals  Special Instructions: Give correctional scale insulin REGARDLESS of PO status NOTIFY Provider for blood glucose LESS THAN 70 milliGRAM(s)/deciLiter or above 400 milliGRAM(s)/deciLiter.  Administration Instructions: *Per Sliding Scale*  Dispose unused medication in BLACK bin.  This is a Look-alike/Sound-alike Medication  Provider's Contact #: [phone] (10-25 @ 14:40)  insulin lispro (HumaLOG) corrective regimen sliding scale:       0 Unit(s) if Glucose 0 - 250      1 Unit(s) if Glucose 251 - 300      2 Unit(s) if Glucose 301 - 350      3 Unit(s) if Glucose 351 - 400      4 Unit(s) if Glucose Greater Than 400 + Contact Provider  SubCutaneous, at bedtime  Special Instructions: Give correctional scale insulin REGARDLESS of PO status NOTIFY Provider for blood glucose LESS THAN 70 milliGRAM(s)/deciLiter or above 400 milliGRAM(s)/deciLiter.  Administration Instructions: Dispose unused medication in BLACK bin.  This is a Look-alike/Sound-alike Medication  Provider's Contact #: [phone] (10-25 @ 14:40)  dextrose 5%.: Solution, 1000 milliLiter(s) infuse at 50 mL/Hr  Special Instructions: Conditional Order: HYPOGLYCEMIA PROTOCOL  Provider's Contact #: [phone] (10-25 @ 14:40)  Administer Carbohydrates:     Additional Instructions:  STAT RESPONSIVE patient and Blood Glucose is LESS THAN 70 milliGRAM(s)/deciLiter: Administer 15 grams of fast acting carbohydrate [e.g., Give 4 ounces of APPLE Juice OR 6 ounces of NON-DIET soda OR 1 tube (15 grams) oral glucose gel (available in Automated Dispensing Machine)] and recheck capillary blood glucose in 15 minutes.  Repeat treatment and recheck glucose every 15 minutes until Blood Glucose is GREATER THAN or EQUAL  milliGRAM(s)/deciLiter and NOTIFY Provider.  HYPOGLYCEMIA PROTOCOL (10-25 @ 14:40)  dextrose 40% Gel: [Known as GLUTOSE 15]  15 Gram(s), Oral, once, PRN for Blood Glucose LESS THAN 70 milliGRAM(s)/deciliter, Stop After 1 Doses  Special Instructions: Conditional Order: HYPOGLYCEMIA PROTOCOL  Administration Instructions: Contents of 37.5 Gram(s) tube delivers 15 Gram(s) dextrose  Provider's Contact #: [phone] (10-25 @ 14:40)  dextrose 50% Injectable:   12.5 Gram(s), IV Push, once, Stop After 1 Doses  Special Instructions: Conditional Order: HYPOGLYCEMIA PROTOCOL  Provider's Contact #: [phone] (10-25 @ 14:40)  dextrose 50% Injectable:   25 Gram(s), IV Push, once, Stop After 1 Doses  Special Instructions: Conditional Order: HYPOGLYCEMIA PROTOCOL  Provider's Contact #: [phone] (10-25 @ 14:40)  dextrose 50% Injectable:   25 Gram(s), IV Push, once, Stop After 1 Doses  Special Instructions: Conditional Order: HYPOGLYCEMIA PROTOCOL  Provider's Contact #: [phone] (10-25 @ 14:40)  glucagon  Injectable:   1 milliGRAM(s), IntraMuscular, once, PRN for Glucose LESS THAN 70 milligrams/deciliter, Stop After 1 Doses  Special Instructions: Conditional Order: HYPOGLYCEMIA PROTOCOL  Provider's Contact #: [phone] (10-25 @ 14:40)  Provider to RN:       UNRESPONSIVE patient and Blood Glucose LESS THAN 70 milliGRAM(s)/deciLiter call Rapid Response.  HYPOGLYCEMIA PROTOCOL (10-25 @ 14:40)  metoprolol tartrate Injectable: [Ordered as LOPRESSOR Injectable]  5 milliGRAM(s), IV Push, every 6 hours  Special Instructions: hsbp<110 P,>60  Provider's Contact #: [phone] (10-25 @ 15:57)  influenza   Vaccine:   0.5 milliLiter(s), IntraMuscular, once  Indication: Immunization  Administration Instructions: Shake well and refrigerate.  If vaccine is to be given at time of discharge, please allow a miniumum of 30 minutes for patient observation.  If to be given concomitantly with Pneumococcal Vaccine, please use a different arm for each vaccination.  This is (10-25 @ 16:07)  POCT  Blood Glucose: 16:18 (10-25 @ 16:29)  POCT  Blood Glucose: 00:24 (10-26 @ 00:25)  POCT  Blood Glucose: 06:47 (10-26 @ 06:50)      Assessment & Opinion:83 y o man seen for evaluation because of failure to thrive ,patient has an h/o parkinson disease and in his living will he refuses any artificial mean of feeding .  DX END STAGE PARKINSON DISEASE     Recommendations:    DVT prophylaxis as ordered. Patient should have  an hospice evaluation   Medications:

## 2018-10-26 NOTE — SWALLOW BEDSIDE ASSESSMENT ADULT - COMMENTS
CT head 10/IMPRESSION:  1)  scattered chronic ischemic changes with mild to moderate volume loss. No acute abnormality suggested..  2)  chronic inflammatory changes noted in the sinuses in the left temporal bone. CT head 10/25/2018IMPRESSION:  1)  scattered chronic ischemic changes with mild to moderate volume loss. No acute abnormality suggested..  2)  chronic inflammatory changes noted in the sinuses in the left temporal bone.    CXR 10/25/2018 IMPRESSION: Stable chest. Negative for acute cardiopulmonary process.

## 2018-10-26 NOTE — SWALLOW BEDSIDE ASSESSMENT ADULT - SLP GENERAL OBSERVATIONS
pt seen bedside, alert and oriented to self. pt nonverbal except 3-4 attempts to phonate and essentially noncommunicative. he inconsistently followed one step directions/models and noted fleeting eye contact, pt kept his eyes closed as the eval progressed.  family present.

## 2018-10-26 NOTE — SWALLOW BEDSIDE ASSESSMENT ADULT - PHARYNGEAL PHASE
Multiple swallows/Decreased laryngeal elevation/change in respiratory rate/Delayed pharyngeal swallow change in respiratory rate and questionable throat clear/Multiple swallows/Delayed pharyngeal swallow/Decreased laryngeal elevation

## 2018-10-26 NOTE — SWALLOW BEDSIDE ASSESSMENT ADULT - SWALLOW EVAL: DIAGNOSIS
pt presented with oropharyngeal dysphagia lei by decreased cognition, reduced labial seal to spoon, slow oral transport/bolus manipulation posterior, suspect delay in swallow trigger with reduced laryngeal elevation and although no overt signs of aspiration noted multiple swallows, and change in respiratory rate after po trials

## 2018-10-26 NOTE — PROGRESS NOTE ADULT - SUBJECTIVE AND OBJECTIVE BOX
Patient is a 83y old  Male who presents with a chief complaint of not eating (26 Oct 2018 13:16)      INTERVAL HPI/ OVERNIGHT EVENTS: Pt was seen and examined at bedside today, No significant overnight events     MEDICATIONS  (STANDING):  dextrose 5% + sodium chloride 0.45%. 1000 milliLiter(s) (50 mL/Hr) IV Continuous <Continuous>  dextrose 5%. 1000 milliLiter(s) (50 mL/Hr) IV Continuous <Continuous>  dextrose 50% Injectable 12.5 Gram(s) IV Push once  dextrose 50% Injectable 25 Gram(s) IV Push once  dextrose 50% Injectable 25 Gram(s) IV Push once  enoxaparin Injectable 40 milliGRAM(s) SubCutaneous every 24 hours  influenza   Vaccine 0.5 milliLiter(s) IntraMuscular once  insulin lispro (HumaLOG) corrective regimen sliding scale   SubCutaneous three times a day before meals  insulin lispro (HumaLOG) corrective regimen sliding scale   SubCutaneous at bedtime  metoprolol tartrate Injectable 5 milliGRAM(s) IV Push every 6 hours    MEDICATIONS  (PRN):  dextrose 40% Gel 15 Gram(s) Oral once PRN Blood Glucose LESS THAN 70 milliGRAM(s)/deciliter  glucagon  Injectable 1 milliGRAM(s) IntraMuscular once PRN Glucose LESS THAN 70 milligrams/deciliter      Allergies    No Known Allergies    Intolerances        REVIEW OF SYSTEMS:    Unable to examine due to [ ] Encephalopathy [ ] Advanced Dementia [ ] Expressive Aphasia [ ] Non-verbal patient    CONSTITUTIONAL: No fever, NO generalized weakness/Fatigue, No weight loss  EYES: No eye pain, visual disturbances, or discharge  ENMT:  No difficulty hearing, tinnitus, vertigo; No sinus or throat pain  NECK: No pain or stiffness  RESPIRATORY: No shortness of breath,  cough, wheezing, sputum or hemoptysis   CARDIOVASCULAR: No chest pain, palpitations, or leg swelling  GASTROINTESTINAL: No abdominal pain. No nausea, vomiting, diarrhea or constipation. No melena or hematochezia.  GENITOURINARY: No dysuria, frequency, hematuria, or incontinence  NEUROLOGICAL: No headaches, Dizziness, memory loss, loss of strength, numbness, or tremors  SKIN: No itching, burning, rashes, or lesions   MUSCULOSKELETAL: No joint pain or swelling; No muscle, back, or extremity pain  PSYCHIATRIC: No depression, anxiety, mood swings, or difficulty sleeping  HEME/LYMPH: No easy bruising, or bleeding gums      Vital Signs Last 24 Hrs  T(C): 36.2 (26 Oct 2018 05:24), Max: 36.2 (26 Oct 2018 05:24)  T(F): 97.2 (26 Oct 2018 05:24), Max: 97.2 (26 Oct 2018 05:24)  HR: 79 (26 Oct 2018 05:24) (76 - 79)  BP: 141/86 (26 Oct 2018 05:24) (141/86 - 157/70)  BP(mean): --  RR: 18 (26 Oct 2018 05:24) (17 - 19)  SpO2: 98% (26 Oct 2018 05:24) (97% - 99%)    PHYSICAL EXAM:  GENERAL: NAD, well-developed, well-groomed  HEAD:  Atraumatic, Normocephalic  EYES: conjunctiva and sclera clear  ENMT: Moist mucous membranes  NECK: Supple, No JVD, Normal thyroid  CHEST/LUNG: Clear to Auscultation bilaterally; No rales, rhonchi, wheezing, or rubs  HEART: Regular rate and rhythm; No murmurs, rubs, or gallops  ABDOMEN: Soft, Nontender, Nondistended; Bowel sounds present  EXTREMITIES:  2+ Peripheral Pulses, No clubbing, cyanosis, or edema  SKIN: No rashes or lesions  NERVOUS SYSTEM:  Alert & Oriented X3, Good concentration; Motor Strength 5/5 B/L upper and lower extremities    LABS:                        11.7   8.38  )-----------( 269      ( 26 Oct 2018 07:24 )             35.0     10-26    142  |  108  |  15  ----------------------------<  138<H>  3.4<L>   |  27  |  0.61    Ca    7.5<L>      26 Oct 2018 07:24    TPro  8.8<H>  /  Alb  3.7  /  TBili  1.4<H>  /  DBili  x   /  AST  42<H>  /  ALT  38  /  AlkPhos  62  10-25    PT/INR - ( 25 Oct 2018 11:48 )   PT: 13.7 sec;   INR: 1.25 ratio         PTT - ( 25 Oct 2018 11:48 )  PTT:28.8 sec  Urinalysis Basic - ( 25 Oct 2018 11:32 )    Color: Yellow / Appearance: Clear / S.020 / pH: x  Gluc: x / Ketone: Moderate  / Bili: Negative / Urobili: 1 mg/dL   Blood: x / Protein: 30 mg/dL / Nitrite: Negative   Leuk Esterase: Trace / RBC: 3-5 /HPF / WBC 0-2   Sq Epi: x / Non Sq Epi: Occasional / Bacteria: Occasional      CAPILLARY BLOOD GLUCOSE      POCT Blood Glucose.: 139 mg/dL (26 Oct 2018 11:41)  POCT Blood Glucose.: 146 mg/dL (26 Oct 2018 06:47)  POCT Blood Glucose.: 130 mg/dL (26 Oct 2018 00:24)  POCT Blood Glucose.: 94 mg/dL (25 Oct 2018 16:18)          RADIOLOGY & ADDITIONAL TESTS:          Imaging Personally Reviewed:  [ ] YES  [ ] NO    Consultant(s) Notes Reviewed:  [ ] YES  [ ] NO    Care Discussed with Consultants/Other Providers [x ] YES  [ ] NO Patient is a 83y old  Male who presents with a chief complaint of not eating (26 Oct 2018 13:16)      INTERVAL HPI/ OVERNIGHT EVENTS: Pt was seen and examined at bedside today, Pt is confused, Family at bedside including Wife, son and daughter, goals of care was discussed at length, Molst form documented for DNR/DNI, and they expressed wishes for Hospice care; referral was made.     MEDICATIONS  (STANDING):  dextrose 5% + sodium chloride 0.45%. 1000 milliLiter(s) (50 mL/Hr) IV Continuous <Continuous>  dextrose 5%. 1000 milliLiter(s) (50 mL/Hr) IV Continuous <Continuous>  dextrose 50% Injectable 12.5 Gram(s) IV Push once  dextrose 50% Injectable 25 Gram(s) IV Push once  dextrose 50% Injectable 25 Gram(s) IV Push once  enoxaparin Injectable 40 milliGRAM(s) SubCutaneous every 24 hours  influenza   Vaccine 0.5 milliLiter(s) IntraMuscular once  insulin lispro (HumaLOG) corrective regimen sliding scale   SubCutaneous three times a day before meals  insulin lispro (HumaLOG) corrective regimen sliding scale   SubCutaneous at bedtime  metoprolol tartrate Injectable 5 milliGRAM(s) IV Push every 6 hours    MEDICATIONS  (PRN):  dextrose 40% Gel 15 Gram(s) Oral once PRN Blood Glucose LESS THAN 70 milliGRAM(s)/deciliter  glucagon  Injectable 1 milliGRAM(s) IntraMuscular once PRN Glucose LESS THAN 70 milligrams/deciliter      Allergies    No Known Allergies    Intolerances        REVIEW OF SYSTEMS:    Unable to examine due to [ ] Encephalopathy [x ] Advanced Dementia [ ] Expressive Aphasia [ ] Non-verbal patient          Vital Signs Last 24 Hrs  T(C): 36.2 (26 Oct 2018 05:24), Max: 36.2 (26 Oct 2018 05:24)  T(F): 97.2 (26 Oct 2018 05:24), Max: 97.2 (26 Oct 2018 05:24)  HR: 79 (26 Oct 2018 05:24) (76 - 79)  BP: 141/86 (26 Oct 2018 05:24) (141/86 - 157/70)  BP(mean): --  RR: 18 (26 Oct 2018 05:24) (17 - 19)  SpO2: 98% (26 Oct 2018 05:24) (97% - 99%)    PHYSICAL EXAM:  GENERAL: NAD, thin, well-groomed  HEAD:  Atraumatic, Normocephalic  EYES: conjunctiva and sclera clear  ENMT: Moist mucous membranes  NECK: Supple, No JVD, Normal thyroid  CHEST/LUNG: Clear to Auscultation bilaterally; No rales, rhonchi, wheezing, or rubs  HEART: Regular rate and rhythm; No murmurs, rubs, or gallops  ABDOMEN: Soft, Nontender, Nondistended; Bowel sounds present  EXTREMITIES: + diffuse stiff joints, 2+ Peripheral Pulses, No clubbing, cyanosis, or edema  SKIN: No rashes or lesions  NERVOUS SYSTEM: confused, does not follow commands, unable to perform neuro exam     LABS:                        11.7   8.38  )-----------( 269      ( 26 Oct 2018 07:24 )             35.0     10-    142  |  108  |  15  ----------------------------<  138<H>  3.4<L>   |  27  |  0.61    Ca    7.5<L>      26 Oct 2018 07:24    TPro  8.8<H>  /  Alb  3.7  /  TBili  1.4<H>  /  DBili  x   /  AST  42<H>  /  ALT  38  /  AlkPhos  62  10-25    PT/INR - ( 25 Oct 2018 11:48 )   PT: 13.7 sec;   INR: 1.25 ratio         PTT - ( 25 Oct 2018 11:48 )  PTT:28.8 sec  Urinalysis Basic - ( 25 Oct 2018 11:32 )    Color: Yellow / Appearance: Clear / S.020 / pH: x  Gluc: x / Ketone: Moderate  / Bili: Negative / Urobili: 1 mg/dL   Blood: x / Protein: 30 mg/dL / Nitrite: Negative   Leuk Esterase: Trace / RBC: 3-5 /HPF / WBC 0-2   Sq Epi: x / Non Sq Epi: Occasional / Bacteria: Occasional      CAPILLARY BLOOD GLUCOSE      POCT Blood Glucose.: 139 mg/dL (26 Oct 2018 11:41)  POCT Blood Glucose.: 146 mg/dL (26 Oct 2018 06:47)  POCT Blood Glucose.: 130 mg/dL (26 Oct 2018 00:24)  POCT Blood Glucose.: 94 mg/dL (25 Oct 2018 16:18)          RADIOLOGY & ADDITIONAL TESTS:          Imaging Personally Reviewed:  [ ] YES  [ ] NO    Consultant(s) Notes Reviewed:  [ ] YES  [ ] NO    Care Discussed with Consultants/Other Providers [x ] YES  [ ] NO

## 2018-10-26 NOTE — PROGRESS NOTE ADULT - PROBLEM SELECTOR PLAN 2
will hold all meds as no ngt BMI 20, presumed malnutrition, Poor prognosis, f/u Hospice evaluation, will cont w/ IV hydration, encourage oral intake, comfort care.

## 2018-10-26 NOTE — PROGRESS NOTE ADULT - PROBLEM SELECTOR PROBLEM 5
Type 2 diabetes mellitus without complication, without long-term current use of insulin Essential hypertension Acquired hypothyroidism

## 2018-10-26 NOTE — SWALLOW BEDSIDE ASSESSMENT ADULT - SWALLOW EVAL: PATIENT/FAMILY GOALS STATEMENT
as per family pt has good days and bad days- although recently more bad days and it could take 30 to 40 minutes to take ensure via spoon. as per family pt has good days and bad days- although recently more bad days and it could take 30 to 40 minutes to take ensure via spoon. dtr reported using thickener at home and that sometimes pt will hold food in his mouth.

## 2018-10-27 DIAGNOSIS — E87.8 OTHER DISORDERS OF ELECTROLYTE AND FLUID BALANCE, NOT ELSEWHERE CLASSIFIED: ICD-10-CM

## 2018-10-27 LAB
ANION GAP SERPL CALC-SCNC: 10 MMOL/L — SIGNIFICANT CHANGE UP (ref 5–17)
BUN SERPL-MCNC: 9 MG/DL — SIGNIFICANT CHANGE UP (ref 7–23)
CALCIUM SERPL-MCNC: 7.5 MG/DL — LOW (ref 8.5–10.1)
CHLORIDE SERPL-SCNC: 108 MMOL/L — SIGNIFICANT CHANGE UP (ref 96–108)
CO2 SERPL-SCNC: 25 MMOL/L — SIGNIFICANT CHANGE UP (ref 22–31)
CREAT SERPL-MCNC: 0.7 MG/DL — SIGNIFICANT CHANGE UP (ref 0.5–1.3)
GLUCOSE BLDC GLUCOMTR-MCNC: 118 MG/DL — HIGH (ref 70–99)
GLUCOSE BLDC GLUCOMTR-MCNC: 134 MG/DL — HIGH (ref 70–99)
GLUCOSE BLDC GLUCOMTR-MCNC: 138 MG/DL — HIGH (ref 70–99)
GLUCOSE BLDC GLUCOMTR-MCNC: 162 MG/DL — HIGH (ref 70–99)
GLUCOSE SERPL-MCNC: 140 MG/DL — HIGH (ref 70–99)
MAGNESIUM SERPL-MCNC: 1.8 MG/DL — SIGNIFICANT CHANGE UP (ref 1.6–2.6)
PHOSPHATE SERPL-MCNC: 1.9 MG/DL — LOW (ref 2.5–4.5)
POTASSIUM SERPL-MCNC: 3.1 MMOL/L — LOW (ref 3.5–5.3)
POTASSIUM SERPL-SCNC: 3.1 MMOL/L — LOW (ref 3.5–5.3)
SODIUM SERPL-SCNC: 143 MMOL/L — SIGNIFICANT CHANGE UP (ref 135–145)

## 2018-10-27 PROCEDURE — 99498 ADVNCD CARE PLAN ADDL 30 MIN: CPT

## 2018-10-27 PROCEDURE — 99497 ADVNCD CARE PLAN 30 MIN: CPT

## 2018-10-27 PROCEDURE — 99233 SBSQ HOSP IP/OBS HIGH 50: CPT

## 2018-10-27 RX ORDER — POTASSIUM CHLORIDE 20 MEQ
10 PACKET (EA) ORAL
Qty: 0 | Refills: 0 | Status: COMPLETED | OUTPATIENT
Start: 2018-10-27 | End: 2018-10-27

## 2018-10-27 RX ORDER — POTASSIUM PHOSPHATE, MONOBASIC POTASSIUM PHOSPHATE, DIBASIC 236; 224 MG/ML; MG/ML
15 INJECTION, SOLUTION INTRAVENOUS ONCE
Qty: 0 | Refills: 0 | Status: COMPLETED | OUTPATIENT
Start: 2018-10-27 | End: 2018-10-27

## 2018-10-27 RX ADMIN — Medication 100 MILLIEQUIVALENT(S): at 15:24

## 2018-10-27 RX ADMIN — Medication 5 MILLIGRAM(S): at 13:29

## 2018-10-27 RX ADMIN — CARBIDOPA AND LEVODOPA 1 TABLET(S): 25; 100 TABLET ORAL at 13:32

## 2018-10-27 RX ADMIN — DULOXETINE HYDROCHLORIDE 20 MILLIGRAM(S): 30 CAPSULE, DELAYED RELEASE ORAL at 13:32

## 2018-10-27 RX ADMIN — Medication 5 MILLIGRAM(S): at 17:29

## 2018-10-27 RX ADMIN — CEFTRIAXONE 100 GRAM(S): 500 INJECTION, POWDER, FOR SOLUTION INTRAMUSCULAR; INTRAVENOUS at 16:22

## 2018-10-27 RX ADMIN — Medication 100 MILLIEQUIVALENT(S): at 13:00

## 2018-10-27 RX ADMIN — Medication 1: at 11:28

## 2018-10-27 RX ADMIN — SODIUM CHLORIDE 50 MILLILITER(S): 9 INJECTION, SOLUTION INTRAVENOUS at 06:22

## 2018-10-27 RX ADMIN — BUPROPION HYDROCHLORIDE 75 MILLIGRAM(S): 150 TABLET, EXTENDED RELEASE ORAL at 13:30

## 2018-10-27 RX ADMIN — POTASSIUM PHOSPHATE, MONOBASIC POTASSIUM PHOSPHATE, DIBASIC 63.75 MILLIMOLE(S): 236; 224 INJECTION, SOLUTION INTRAVENOUS at 16:32

## 2018-10-27 RX ADMIN — CARBIDOPA AND LEVODOPA 1 TABLET(S): 25; 100 TABLET ORAL at 22:09

## 2018-10-27 RX ADMIN — FINASTERIDE 5 MILLIGRAM(S): 5 TABLET, FILM COATED ORAL at 13:31

## 2018-10-27 RX ADMIN — ENOXAPARIN SODIUM 40 MILLIGRAM(S): 100 INJECTION SUBCUTANEOUS at 16:36

## 2018-10-27 RX ADMIN — Medication 5 MILLIGRAM(S): at 06:23

## 2018-10-27 RX ADMIN — Medication 100 MILLIEQUIVALENT(S): at 11:29

## 2018-10-27 NOTE — PROGRESS NOTE ADULT - SUBJECTIVE AND OBJECTIVE BOX
Patient is a 83y old  Male who presents with a chief complaint of not eating (26 Oct 2018 15:46)      INTERVAL HPI/ OVERNIGHT EVENTS: Pt was seen and examined at bedside today, No significant overnight events, pt is confused.      MEDICATIONS  (STANDING):  buPROPion . 75 milliGRAM(s) Oral daily  carbidopa/levodopa  25/250 1 Tablet(s) Oral three times a day  cefTRIAXone   IVPB 1 Gram(s) IV Intermittent every 24 hours  cefTRIAXone   IVPB      dextrose 5% + sodium chloride 0.45%. 1000 milliLiter(s) (50 mL/Hr) IV Continuous <Continuous>  dextrose 5%. 1000 milliLiter(s) (50 mL/Hr) IV Continuous <Continuous>  dextrose 50% Injectable 12.5 Gram(s) IV Push once  dextrose 50% Injectable 25 Gram(s) IV Push once  dextrose 50% Injectable 25 Gram(s) IV Push once  DULoxetine 20 milliGRAM(s) Oral daily  enoxaparin Injectable 40 milliGRAM(s) SubCutaneous every 24 hours  finasteride 5 milliGRAM(s) Oral daily  influenza   Vaccine 0.5 milliLiter(s) IntraMuscular once  insulin lispro (HumaLOG) corrective regimen sliding scale   SubCutaneous three times a day before meals  insulin lispro (HumaLOG) corrective regimen sliding scale   SubCutaneous at bedtime  levothyroxine 75 MICROGram(s) Oral daily  metoprolol tartrate Injectable 5 milliGRAM(s) IV Push every 6 hours  potassium chloride  10 mEq/100 mL IVPB 10 milliEquivalent(s) IV Intermittent every 1 hour  potassium phosphate IVPB 15 milliMole(s) IV Intermittent once    MEDICATIONS  (PRN):  dextrose 40% Gel 15 Gram(s) Oral once PRN Blood Glucose LESS THAN 70 milliGRAM(s)/deciliter  glucagon  Injectable 1 milliGRAM(s) IntraMuscular once PRN Glucose LESS THAN 70 milligrams/deciliter      Allergies    No Known Allergies    Intolerances        REVIEW OF SYSTEMS:    Unable to examine due to [ ] Encephalopathy [x ] Advanced Dementia [ ] Expressive Aphasia [ ] Non-verbal patient          Vital Signs Last 24 Hrs  T(C): 37.1 (27 Oct 2018 11:48), Max: 37.3 (26 Oct 2018 17:09)  T(F): 98.8 (27 Oct 2018 11:48), Max: 99.1 (26 Oct 2018 17:09)  HR: 86 (27 Oct 2018 11:48) (72 - 96)  BP: 167/87 (27 Oct 2018 11:48) (135/63 - 167/87)  BP(mean): --  RR: 20 (27 Oct 2018 11:48) (16 - 20)  SpO2: 98% (27 Oct 2018 11:48) (96% - 98%)    PHYSICAL EXAM:  GENERAL: NAD, thin, well-groomed  HEAD:  Atraumatic, Normocephalic  EYES: conjunctiva and sclera clear  ENMT: Moist mucous membranes  NECK: Supple, No JVD, Normal thyroid  CHEST/LUNG: Clear to Auscultation bilaterally; No rales, rhonchi, wheezing, or rubs  HEART: Regular rate and rhythm; No murmurs, rubs, or gallops  ABDOMEN: Soft, Nontender, Nondistended; Bowel sounds present  EXTREMITIES: + diffuse stiff joints, 2+ Peripheral Pulses, No clubbing, cyanosis, or edema  SKIN: No rashes or lesions  NERVOUS SYSTEM: confused, does not follow commands, unable to perform neuro exam     LABS:                        11.7   8.38  )-----------( 269      ( 26 Oct 2018 07:24 )             35.0     10-27    143  |  108  |  9   ----------------------------<  140<H>  3.1<L>   |  25  |  0.70    Ca    7.5<L>      27 Oct 2018 06:49  Phos  1.9     10-27  Mg     1.8     10-27          CAPILLARY BLOOD GLUCOSE      POCT Blood Glucose.: 162 mg/dL (27 Oct 2018 11:27)  POCT Blood Glucose.: 138 mg/dL (27 Oct 2018 07:40)  POCT Blood Glucose.: 116 mg/dL (26 Oct 2018 22:47)  POCT Blood Glucose.: 126 mg/dL (26 Oct 2018 16:49)        Culture - Urine (collected 10-25-18)  Source: .Urine Clean Catch (Midstream)  Final Report (10-26-18):    No growth        RADIOLOGY & ADDITIONAL TESTS:          Imaging Personally Reviewed:  [ ] YES  [ ] NO    Consultant(s) Notes Reviewed:  [ ] YES  [ ] NO    Care Discussed with Consultants/Other Providers [x ] YES  [ ] NO

## 2018-10-27 NOTE — PROGRESS NOTE ADULT - PROBLEM SELECTOR PLAN 1
Head CT negative, TSH wnl, vitamin B12, no significant infection, most likely secondary to progressive Dementia.

## 2018-10-27 NOTE — PROGRESS NOTE ADULT - PROBLEM SELECTOR PLAN 3
afebrile, no leukocytosis, no significant change in mentation, significant infection unlikely, will d/c

## 2018-10-27 NOTE — PROGRESS NOTE ADULT - PROBLEM SELECTOR PLAN 2
BMI 20, presumed malnutrition, Poor prognosis, f/u Hospice evaluation, will cont w/ IV hydration, encourage oral intake, comfort care.

## 2018-10-27 NOTE — PROGRESS NOTE ADULT - SUBJECTIVE AND OBJECTIVE BOX
INTERVAL HPI/OVERNIGHT EVENTS:  Patient is a 83y old  Male who presents with a chief complaint of not eating, now eat small amount as per family members.     Allergies  No Known Allergies    Intolerances    Code Status: DNR/ DNI          PRESENT SYMPTOMS:   SOURCE:  [ ] Patient   [X ] Family   [ ] Team     Pain:   Onset:      Location:          Duration:        Character:         Aggravating factors:          Relieving Factors:             Timing:         Severity:      Dyspnea [ ] YES [X ] NO - Mild [ ]  Moderate [ ]  Severe [ ]   Anxiety:  [X ] YES [ ] NO  Fatigue: [X ] YES [ ] NO  Nausea: [ ] YES [X ] NO  Loss of Appetite: [X ] YES [ ] NO  Constipation [ ] YES   [X ] No     OTHER SYMPTOMS:  [ ] All other ROS negative     [X ] Unable to obtain due to poor mentation    Does the patient meet criteria for Severe Protein Calorie Malnutrition?  Yes [X ]  No [ ]   PPSV 30% or below [X ]  Anasarca [ ]  Albumin <2 [ ]  Catabolic State [ ]  Poor nutritional intake [ X]  Significant weight loss [ ]     MEDICATIONS  (STANDING):  buPROPion . 75 milliGRAM(s) Oral daily  carbidopa/levodopa  25/250 1 Tablet(s) Oral three times a day  cefTRIAXone   IVPB 1 Gram(s) IV Intermittent every 24 hours  cefTRIAXone   IVPB      dextrose 5% + sodium chloride 0.45%. 1000 milliLiter(s) (50 mL/Hr) IV Continuous <Continuous>  dextrose 5%. 1000 milliLiter(s) (50 mL/Hr) IV Continuous <Continuous>  dextrose 50% Injectable 12.5 Gram(s) IV Push once  dextrose 50% Injectable 25 Gram(s) IV Push once  dextrose 50% Injectable 25 Gram(s) IV Push once  DULoxetine 20 milliGRAM(s) Oral daily  enoxaparin Injectable 40 milliGRAM(s) SubCutaneous every 24 hours  finasteride 5 milliGRAM(s) Oral daily  influenza   Vaccine 0.5 milliLiter(s) IntraMuscular once  insulin lispro (HumaLOG) corrective regimen sliding scale   SubCutaneous three times a day before meals  insulin lispro (HumaLOG) corrective regimen sliding scale   SubCutaneous at bedtime  levothyroxine 75 MICROGram(s) Oral daily  metoprolol tartrate Injectable 5 milliGRAM(s) IV Push every 6 hours  potassium chloride  10 mEq/100 mL IVPB 10 milliEquivalent(s) IV Intermittent every 1 hour  potassium phosphate IVPB 15 milliMole(s) IV Intermittent once    MEDICATIONS  (PRN):  dextrose 40% Gel 15 Gram(s) Oral once PRN Blood Glucose LESS THAN 70 milliGRAM(s)/deciliter  glucagon  Injectable 1 milliGRAM(s) IntraMuscular once PRN Glucose LESS THAN 70 milligrams/deciliter      Palliative Performance Status Version 2: 30 %  ECOG - 4     Physical Exam:    General: [X ] Alert,  A&O x     [X ] lethargic   [ ] Agitated   [X ] Cachexia   HEENT: [ ] Normal   [ X] Dry mouth   [ ] ET Tube    [ ] Trach   Lungs: [ ] Clear [ X] Rhonchi  [ ] Crackles [ ] Wheezing [ ] Tachypnea  [ ] Audible excessive secretions   Cardiovascular:  [X ] Regular rate and rhythm  [ ] Irregular [ ] Tachycardia   [ ] Bradycardia   Abdomen: [X ] Soft  [ ] Distended  [X ] +BS  [X ] Non tender [ ] Tender  [ ]PEG   [ ] NGT   Last BM:     Genitourinary:  [ ] Normal [X ] Incontinent   [ ] Oliguria/Anuria   [ ] Jarvis  Musculoskeletal:  [ ] Normal   [ ] Generalized weakness  [X ] Bedbound  [ ] Edema   Neurological: [ ] No focal deficits  [ X] Cognitive impairment     Skin: [ ] Normal   [ ] Pressure ulcers     Vital Signs Last 24 Hrs  T(C): 37.1 (27 Oct 2018 11:48), Max: 37.3 (26 Oct 2018 17:09)  T(F): 98.8 (27 Oct 2018 11:48), Max: 99.1 (26 Oct 2018 17:09)  HR: 86 (27 Oct 2018 11:48) (72 - 96)  BP: 167/87 (27 Oct 2018 11:48) (135/63 - 167/87)  BP(mean): --  RR: 20 (27 Oct 2018 11:48) (16 - 20)  SpO2: 98% (27 Oct 2018 11:48) (96% - 98%)    LABS:                        11.7   8.38  )-----------( 269      ( 26 Oct 2018 07:24 )             35.0     10-27    143  |  108  |  9   ----------------------------<  140<H>  3.1<L>   |  25  |  0.70    Ca    7.5<L>      27 Oct 2018 06:49  Phos  1.9     10-27  Mg     1.8     10-27    I&O's Summary    26 Oct 2018 07:01  -  27 Oct 2018 07:00  --------------------------------------------------------  IN: 0 mL / OUT: 0 mL / NET: 0 mL    RADIOLOGY & ADDITIONAL STUDIES:

## 2018-10-28 LAB
ANION GAP SERPL CALC-SCNC: 10 MMOL/L — SIGNIFICANT CHANGE UP (ref 5–17)
BUN SERPL-MCNC: 9 MG/DL — SIGNIFICANT CHANGE UP (ref 7–23)
CALCIUM SERPL-MCNC: 7.7 MG/DL — LOW (ref 8.5–10.1)
CHLORIDE SERPL-SCNC: 107 MMOL/L — SIGNIFICANT CHANGE UP (ref 96–108)
CO2 SERPL-SCNC: 24 MMOL/L — SIGNIFICANT CHANGE UP (ref 22–31)
CREAT SERPL-MCNC: 0.75 MG/DL — SIGNIFICANT CHANGE UP (ref 0.5–1.3)
GLUCOSE BLDC GLUCOMTR-MCNC: 107 MG/DL — HIGH (ref 70–99)
GLUCOSE BLDC GLUCOMTR-MCNC: 110 MG/DL — HIGH (ref 70–99)
GLUCOSE BLDC GLUCOMTR-MCNC: 112 MG/DL — HIGH (ref 70–99)
GLUCOSE BLDC GLUCOMTR-MCNC: 145 MG/DL — HIGH (ref 70–99)
GLUCOSE SERPL-MCNC: 128 MG/DL — HIGH (ref 70–99)
PHOSPHATE SERPL-MCNC: 2.2 MG/DL — LOW (ref 2.5–4.5)
POTASSIUM SERPL-MCNC: 3.6 MMOL/L — SIGNIFICANT CHANGE UP (ref 3.5–5.3)
POTASSIUM SERPL-SCNC: 3.6 MMOL/L — SIGNIFICANT CHANGE UP (ref 3.5–5.3)
SODIUM SERPL-SCNC: 141 MMOL/L — SIGNIFICANT CHANGE UP (ref 135–145)
VIT B12 SERPL-MCNC: 1302 PG/ML — HIGH (ref 232–1245)

## 2018-10-28 PROCEDURE — 99233 SBSQ HOSP IP/OBS HIGH 50: CPT

## 2018-10-28 RX ORDER — POTASSIUM PHOSPHATE, MONOBASIC POTASSIUM PHOSPHATE, DIBASIC 236; 224 MG/ML; MG/ML
15 INJECTION, SOLUTION INTRAVENOUS ONCE
Qty: 0 | Refills: 0 | Status: COMPLETED | OUTPATIENT
Start: 2018-10-28 | End: 2018-10-28

## 2018-10-28 RX ADMIN — CARBIDOPA AND LEVODOPA 1 TABLET(S): 25; 100 TABLET ORAL at 14:39

## 2018-10-28 RX ADMIN — FINASTERIDE 5 MILLIGRAM(S): 5 TABLET, FILM COATED ORAL at 11:41

## 2018-10-28 RX ADMIN — CARBIDOPA AND LEVODOPA 1 TABLET(S): 25; 100 TABLET ORAL at 22:32

## 2018-10-28 RX ADMIN — Medication 5 MILLIGRAM(S): at 06:03

## 2018-10-28 RX ADMIN — POTASSIUM PHOSPHATE, MONOBASIC POTASSIUM PHOSPHATE, DIBASIC 63.75 MILLIMOLE(S): 236; 224 INJECTION, SOLUTION INTRAVENOUS at 11:41

## 2018-10-28 RX ADMIN — Medication 5 MILLIGRAM(S): at 11:50

## 2018-10-28 RX ADMIN — ENOXAPARIN SODIUM 40 MILLIGRAM(S): 100 INJECTION SUBCUTANEOUS at 16:40

## 2018-10-28 RX ADMIN — CARBIDOPA AND LEVODOPA 1 TABLET(S): 25; 100 TABLET ORAL at 05:59

## 2018-10-28 RX ADMIN — Medication 5 MILLIGRAM(S): at 17:37

## 2018-10-28 RX ADMIN — Medication 75 MICROGRAM(S): at 05:59

## 2018-10-28 RX ADMIN — BUPROPION HYDROCHLORIDE 75 MILLIGRAM(S): 150 TABLET, EXTENDED RELEASE ORAL at 11:41

## 2018-10-28 RX ADMIN — DULOXETINE HYDROCHLORIDE 20 MILLIGRAM(S): 30 CAPSULE, DELAYED RELEASE ORAL at 11:41

## 2018-10-28 NOTE — PROGRESS NOTE ADULT - SUBJECTIVE AND OBJECTIVE BOX
Patient is a 83y old  Male who presents with a chief complaint of not eating (27 Oct 2018 14:10)      INTERVAL HPI/ OVERNIGHT EVENTS: Pt was seen and examined at bedside today, No significant overnight events, pt is more verbal today but still confused. As per Nursing staff pt is still has poor appetite      MEDICATIONS  (STANDING):  buPROPion . 75 milliGRAM(s) Oral daily  carbidopa/levodopa  25/250 1 Tablet(s) Oral three times a day  dextrose 5%. 1000 milliLiter(s) (50 mL/Hr) IV Continuous <Continuous>  dextrose 50% Injectable 12.5 Gram(s) IV Push once  dextrose 50% Injectable 25 Gram(s) IV Push once  dextrose 50% Injectable 25 Gram(s) IV Push once  DULoxetine 20 milliGRAM(s) Oral daily  enoxaparin Injectable 40 milliGRAM(s) SubCutaneous every 24 hours  finasteride 5 milliGRAM(s) Oral daily  influenza   Vaccine 0.5 milliLiter(s) IntraMuscular once  insulin lispro (HumaLOG) corrective regimen sliding scale   SubCutaneous three times a day before meals  insulin lispro (HumaLOG) corrective regimen sliding scale   SubCutaneous at bedtime  levothyroxine 75 MICROGram(s) Oral daily  metoprolol tartrate Injectable 5 milliGRAM(s) IV Push every 6 hours  potassium phosphate IVPB 15 milliMole(s) IV Intermittent once    MEDICATIONS  (PRN):  dextrose 40% Gel 15 Gram(s) Oral once PRN Blood Glucose LESS THAN 70 milliGRAM(s)/deciliter  glucagon  Injectable 1 milliGRAM(s) IntraMuscular once PRN Glucose LESS THAN 70 milligrams/deciliter      Allergies    No Known Allergies    Intolerances        REVIEW OF SYSTEMS:    Unable to examine due to [ ] Encephalopathy [x ] Advanced Dementia [ ] Expressive Aphasia [ ] Non-verbal patient        Vital Signs Last 24 Hrs  T(C): 37.4 (28 Oct 2018 04:48), Max: 37.7 (27 Oct 2018 23:34)  T(F): 99.4 (28 Oct 2018 04:48), Max: 99.8 (27 Oct 2018 23:34)  HR: 84 (28 Oct 2018 04:48) (78 - 86)  BP: 141/51 (28 Oct 2018 04:48) (106/64 - 167/87)  BP(mean): --  RR: 18 (28 Oct 2018 04:48) (18 - 20)  SpO2: 97% (28 Oct 2018 04:48) (96% - 98%)    PHYSICAL EXAM:  GENERAL: NAD, thin, well-groomed  HEAD:  Atraumatic, Normocephalic  EYES: conjunctiva and sclera clear  ENMT: Moist mucous membranes  NECK: Supple, No JVD, Normal thyroid  CHEST/LUNG: Clear to Auscultation bilaterally; No rales, rhonchi, wheezing, or rubs  HEART: Regular rate and rhythm; No murmurs, rubs, or gallops  ABDOMEN: Soft, Nontender, Nondistended; Bowel sounds present  EXTREMITIES: + diffuse stiff joints, 2+ Peripheral Pulses, No clubbing, cyanosis, or edema  SKIN: No rashes or lesions  NERVOUS SYSTEM: confused, does not follow commands, unable to perform neuro exam       LABS:    10-28    141  |  107  |  9   ----------------------------<  128<H>  3.6   |  24  |  0.75    Ca    7.7<L>      28 Oct 2018 07:44  Phos  2.2     10-28  Mg     1.8     10-27          CAPILLARY BLOOD GLUCOSE      POCT Blood Glucose.: 145 mg/dL (28 Oct 2018 07:53)  POCT Blood Glucose.: 118 mg/dL (27 Oct 2018 22:07)  POCT Blood Glucose.: 134 mg/dL (27 Oct 2018 16:19)  POCT Blood Glucose.: 162 mg/dL (27 Oct 2018 11:27)        Culture - Urine (collected 10-25-18)  Source: .Urine Clean Catch (Midstream)  Final Report (10-26-18):    No growth        RADIOLOGY & ADDITIONAL TESTS:          Imaging Personally Reviewed:  [ ] YES  [ ] NO    Consultant(s) Notes Reviewed:  [x ] YES  [ ] NO    Care Discussed with Consultants/Other Providers [x ] YES  [ ] NO

## 2018-10-28 NOTE — PROGRESS NOTE ADULT - SUBJECTIVE AND OBJECTIVE BOX
Subjective Complaints:  Historian:     chart    REVIEW OF SYSTEMS:  Eyes:  Good vision, no reported pain  ENT:  No sore throat, pain, runny nose, dysphagia  CV:  No pain, palpitatioins, hypo/hypertension  Resp:  No dyspnea, cough, tachypnea, wheezing  GI:  No pain, nausea, vomiting, diarrhea, constipatiion  Muscle:  No pain, weakness  Neuro:  No weakness, tingling, memory problems  Psych:  No fatigue, insomnia, mood problems, depression  Endocrine:  No polyuria, polydypsia, cold/heat intolerance    Vital Signs Last 24 Hrs  T(C): 36.9 (28 Oct 2018 11:53), Max: 37.7 (27 Oct 2018 23:34)  T(F): 98.4 (28 Oct 2018 11:53), Max: 99.8 (27 Oct 2018 23:34)  HR: 75 (28 Oct 2018 11:53) (75 - 84)  BP: 161/87 (28 Oct 2018 11:53) (106/64 - 161/87)  BP(mean): --  RR: 18 (28 Oct 2018 11:53) (18 - 18)  SpO2: 96% (28 Oct 2018 11:53) (96% - 97%)    GENERAL PHYSICAL EXAM:  General:  Appears stated age, well-groomed, well-nourished, no distress  HEENT:  NC/AT, patent nares w/ pink mucosa, OP clear w/o lesions, PERRL, EOMI, conjunctivae clear, no thyromegaly, nodules, adenopathy, no JVD  Chest:  Full & symmetric excursion, no increased effort, breath sounds clear  Cardiovascular:  Regular rhythm, S1, S2, no murmur/rub/S3/S4, no carotid/femoral/abdominal bruit, radial/pedal pulses 2+, no edema  Abdomen:  Soft, non-tender, non-distended, normoactive bowel sounds, no HSM  Extremities:  Gait & station:   Digits:   Nails:   Joints, Bones, Muscles:   ROM:   Stability:  Skin:  No rash/erythema/ecchymoses/petechiae/wounds/abscess/warm/dry  Musculoskeletal:  Full ROM in all joints w/o swelling/tenderness/effusion    NEUROLOGICAL EXAM:  HENT:  Normocephalic head; atraumatic head.  Neck supple.  ENT: normal looking.  Mental State:    Alert.  oriented to person,  Coherent.  Speech hesitant.  Cooperative.  Responds occasionally to questions     Cranial Nerves:  II-XII:   Pupils round and reactive to light and accommodation.  Extraocular movements full.  Visual fields full (no homonymous hemianopsia).  Visual acuity wnl.  Facial symmetry   Tongue midline.  Motor Functions:  Motor strength is 3/5 on both sides increase of tone   Sensory Functions: unreliable   Reflexes:  Deep tendon reflexes normoactive to biceps, knees and ankles.  Babinski absent (present).  Cerebellar Testing:    Finger to nose intact.  Nystagmus absent.  DX PARKINSON DISEASE --CHANGE IN MENTAL STATUS --IMPROVING       LABS:    10-28    141  |  107  |  9   ----------------------------<  128<H>  3.6   |  24  |  0.75    Ca    7.7<L>      28 Oct 2018 07:44  Phos  2.2     10-28  Mg     1.8     10-27              RADIOLOGY & ADDITIONAL STUDIES:    POCT  Blood Glucose: 16:19 (10-27 @ 16:21)  POCT  Blood Glucose: 22:07 (10-27 @ 22:10)  POCT  Blood Glucose: 07:53 (10-28 @ 08:01)  potassium phosphate IVPB:   15 milliMole(s) in sodium chloride 0.9% 250 milliLiter(s), IV Intermittent, once, infuse over 4 Hour(s), Stop After 1 Doses  Provider's Contact #: [phone] (10-28 @ 08:35)  POCT  Blood Glucose: 11:34 (10-28 @ 11:54)      Recommendations:  PATIENT IS EATING NOW  EEG.   DVT prophylaxis as ordered.  Medications:  CONTINUE SINEMET

## 2018-10-29 LAB — GLUCOSE BLDC GLUCOMTR-MCNC: 127 MG/DL — HIGH (ref 70–99)

## 2018-10-29 PROCEDURE — 99232 SBSQ HOSP IP/OBS MODERATE 35: CPT

## 2018-10-29 RX ADMIN — BUPROPION HYDROCHLORIDE 75 MILLIGRAM(S): 150 TABLET, EXTENDED RELEASE ORAL at 12:13

## 2018-10-29 RX ADMIN — Medication 5 MILLIGRAM(S): at 12:13

## 2018-10-29 RX ADMIN — CARBIDOPA AND LEVODOPA 1 TABLET(S): 25; 100 TABLET ORAL at 21:55

## 2018-10-29 RX ADMIN — FINASTERIDE 5 MILLIGRAM(S): 5 TABLET, FILM COATED ORAL at 12:13

## 2018-10-29 RX ADMIN — Medication 75 MICROGRAM(S): at 06:03

## 2018-10-29 RX ADMIN — Medication 5 MILLIGRAM(S): at 06:03

## 2018-10-29 RX ADMIN — CARBIDOPA AND LEVODOPA 1 TABLET(S): 25; 100 TABLET ORAL at 06:03

## 2018-10-29 RX ADMIN — ENOXAPARIN SODIUM 40 MILLIGRAM(S): 100 INJECTION SUBCUTANEOUS at 16:43

## 2018-10-29 RX ADMIN — Medication 5 MILLIGRAM(S): at 00:50

## 2018-10-29 RX ADMIN — Medication 5 MILLIGRAM(S): at 18:31

## 2018-10-29 RX ADMIN — DULOXETINE HYDROCHLORIDE 20 MILLIGRAM(S): 30 CAPSULE, DELAYED RELEASE ORAL at 12:13

## 2018-10-29 NOTE — PROGRESS NOTE ADULT - PROBLEM SELECTOR PLAN 5
hypophos, replace and monitor   -per family would still want blood draws every several weeks or monthly

## 2018-10-29 NOTE — DIETITIAN INITIAL EVALUATION ADULT. - NS AS NUTRI INTERV MEALS SNACK
Texture-modified diet/Mineral - modified diet/Recommend Dysphagia 1 Pureed-Honey Thick, Lacto-vegetarian diet (No need for CCHO diet at this time as HgbA1c and POCT levels are in acceptable range). Recommend Dysphagia 1 Pureed-Honey Thick, Lacto-vegetarian diet (No need for CCHO diet at this time as HgbA1c and POCT levels are in acceptable range and pt with poor po intake)./Texture-modified diet/Diets modified for specific foods and ingredients

## 2018-10-29 NOTE — DIETITIAN INITIAL EVALUATION ADULT. - PHYSICAL APPEARANCE
BMI: 20.9 (underweight for age); No edema; No pressure ulcers; Nutrition focused physical exam conducted; Subcutaneous fat loss: [mild] Orbital fat pads region, [mild]Buccal fat region, [moderate]Triceps region,  [unable]Ribs region.  Muscle wasting: [mild]Temples region, [mild]Clavicle region, [moderate]Shoulder region, [unable]Scapula region, [mild]Interosseous region, [unable]thigh region, [mild]Calf region/underweight/debilitated

## 2018-10-29 NOTE — DIETITIAN INITIAL EVALUATION ADULT. - ETIOLOGY
Inadequate energy/protein intake related to chronic illness (Parkinson's with advanced Dementia, DM)

## 2018-10-29 NOTE — DIETITIAN INITIAL EVALUATION ADULT. - ENERGY NEEDS
Ht: 5' 0", Wt: 46.6 kg (10/29), IBW: 48.2 kg +/- 10%, UBW: 68 kg (x 3 years ago), %IBW: 96.7%, %UBW: 68.5%

## 2018-10-29 NOTE — DIETITIAN INITIAL EVALUATION ADULT. - PERTINENT LABORATORY DATA
10-28 Na141 mmol/L Glu 128 mg/dL<H> K+ 3.6 mmol/L Cr  0.75 mg/dL BUN 9 mg/dL WBC --    10-28 Phos 2.2 mg/dL<L> 10-25 Alb 3.7 g/dL 10-26 [id number] 5.6 %

## 2018-10-29 NOTE — CHART NOTE - NSCHARTNOTEFT_GEN_A_CORE
Upon Nutritional Assessment by the Registered Dietitian your patient was determined to meet criteria / has evidence of the following diagnosis/diagnoses:          [ ]  Mild Protein Calorie Malnutrition        [x]  Moderate Protein Calorie Malnutrition        [ ] Severe Protein Calorie Malnutrition        [ ] Unspecified Protein Calorie Malnutrition        [ ] Underweight / BMI <19        [ ] Morbid Obesity / BMI > 40      Findings as based on:  •  Comprehensive nutrition assessment and consultation  •  Calorie counts (nutrient intake analysis)  •  Food acceptance and intake status from observations by staff  •  Follow up  •  Patient education  •  Intervention secondary to interdisciplinary rounds  •   concerns      Treatment:    The following diet has been recommended:  Dysphagia 1 Pureed-Honey Consistency Fluids, Lactovegetarian diet + Ensure Pudding 3x daily (510 kcals & 12 gm protein)    PROVIDER Section:     By signing this assessment you are acknowledging and agree with the diagnosis/diagnoses assigned by the Registered Dietitian    Comments:

## 2018-10-29 NOTE — DIETITIAN INITIAL EVALUATION ADULT. - OTHER INFO
Pt seen for Failure to Thrive in adult. Pt's family reported pt's po intake poor for a while now, especially poor x 4 days pta. Pt is lacto-vegetarian does not eat eggs, fish or meats but does eat dairy products. Pt's family reported that pt lives at home with wife and son's family, and family does food shopping/cooking for pt. Pt's family feeds pt soft foods at home which he tolerates a little bit at a time and sometimes give him Glucerna if he is not eating well. Pt's family reported pt has been clenching teeth and not swallowing foods pta, pt currently on pureed-honey thick diet per SLP recommendations. Pt's family reported pt weighed 150# about 3 years ago and has been losing wt for the past 3 years. Pt's family reported pt has weighed 100-105# for the past 6 months or so and has not really been ambulating for the past 6 months. Pt's family denies pt has food allergies. Pt's family wants only comfort care, pt is DNR/DNI. Palliative care team following pt.

## 2018-10-29 NOTE — PROGRESS NOTE ADULT - PROBLEM SELECTOR PLAN 1
-seen by Palliative NP, MOLST completed, patient DNR/DNI, awaiting hospice evaluation.  -for now comfort care only

## 2018-10-29 NOTE — DIETITIAN INITIAL EVALUATION ADULT. - PERTINENT MEDS FT
MEDICATIONS  (STANDING):  buPROPion . 75 milliGRAM(s) Oral daily  carbidopa/levodopa  25/250 1 Tablet(s) Oral three times a day  dextrose 5%. 1000 milliLiter(s) (50 mL/Hr) IV Continuous <Continuous>  dextrose 50% Injectable 12.5 Gram(s) IV Push once  dextrose 50% Injectable 25 Gram(s) IV Push once  dextrose 50% Injectable 25 Gram(s) IV Push once  DULoxetine 20 milliGRAM(s) Oral daily  enoxaparin Injectable 40 milliGRAM(s) SubCutaneous every 24 hours  finasteride 5 milliGRAM(s) Oral daily  influenza   Vaccine 0.5 milliLiter(s) IntraMuscular once  insulin lispro (HumaLOG) corrective regimen sliding scale   SubCutaneous three times a day before meals  insulin lispro (HumaLOG) corrective regimen sliding scale   SubCutaneous at bedtime  levothyroxine 75 MICROGram(s) Oral daily  metoprolol tartrate Injectable 5 milliGRAM(s) IV Push every 6 hours    MEDICATIONS  (PRN):  dextrose 40% Gel 15 Gram(s) Oral once PRN Blood Glucose LESS THAN 70 milliGRAM(s)/deciliter  glucagon  Injectable 1 milliGRAM(s) IntraMuscular once PRN Glucose LESS THAN 70 milligrams/deciliter

## 2018-10-29 NOTE — DIETITIAN INITIAL EVALUATION ADULT. - FACTORS AFF FOOD INTAKE
difficulty chewing/difficulty feeding self/difficulty swallowing/Advanced dementia, Parkinson's/change in mental status

## 2018-10-29 NOTE — PROGRESS NOTE ADULT - SUBJECTIVE AND OBJECTIVE BOX
Patient is a 83y old  Male who presents with a chief complaint of not eating (27 Oct 2018 14:10)      INTERVAL HPI/ OVERNIGHT EVENTS:   Pt was seen and examined at bedside.   No significant overnight events.   Per staff, eating slightly better     unable to examine due to [ ] Encephalopathy  [X ] Advamced Dementia  [ ] Expressive Aphasia  [ ] Non-verbal patient      MEDICATIONS  (STANDING):  buPROPion . 75 milliGRAM(s) Oral daily  carbidopa/levodopa  25/250 1 Tablet(s) Oral three times a day  dextrose 5%. 1000 milliLiter(s) (50 mL/Hr) IV Continuous <Continuous>  dextrose 50% Injectable 12.5 Gram(s) IV Push once  dextrose 50% Injectable 25 Gram(s) IV Push once  dextrose 50% Injectable 25 Gram(s) IV Push once  DULoxetine 20 milliGRAM(s) Oral daily  enoxaparin Injectable 40 milliGRAM(s) SubCutaneous every 24 hours  finasteride 5 milliGRAM(s) Oral daily  influenza   Vaccine 0.5 milliLiter(s) IntraMuscular once  insulin lispro (HumaLOG) corrective regimen sliding scale   SubCutaneous three times a day before meals  insulin lispro (HumaLOG) corrective regimen sliding scale   SubCutaneous at bedtime  levothyroxine 75 MICROGram(s) Oral daily  metoprolol tartrate Injectable 5 milliGRAM(s) IV Push every 6 hours  potassium phosphate IVPB 15 milliMole(s) IV Intermittent once    MEDICATIONS  (PRN):  dextrose 40% Gel 15 Gram(s) Oral once PRN Blood Glucose LESS THAN 70 milliGRAM(s)/deciliter  glucagon  Injectable 1 milliGRAM(s) IntraMuscular once PRN Glucose LESS THAN 70 milligrams/deciliter      Allergies    No Known Allergies    Intolerances      Vitals stable.     PHYSICAL EXAM:  GENERAL: NAD, thin, well-groomed, opens eyes, does not follow commands   HEENT: NCAT, PERRLA, EOMI, MMM  CHEST/LUNG: Clear to Auscultation bilaterally; No rales, rhonchi, wheezing, or rubs  HEART: Regular rate and rhythm; No murmurs, rubs, or gallops  ABDOMEN: Soft, Nontender, Nondistended; +BS  EXTREMITIES: + diffuse stiff joints, 2+ Peripheral Pulses, No clubbing, cyanosis, or edema b/l   NERVOUS SYSTEM: non-verbal, moving all extremities       Labs and imaging reviewed.           Culture - Urine (collected 10-25-18)  Source: .Urine Clean Catch (Midstream)  Final Report (10-26-18):    No growth            Imaging Personally Reviewed:  [ ] YES  [ ] NO    Consultant(s) Notes Reviewed:  [x ] YES  [ ] NO    Care Discussed with Consultants/Other Providers [x ] YES  [ ] NO    Care discussed in detail with family at bedside.  All questions and concerns addressed

## 2018-10-30 PROCEDURE — 99232 SBSQ HOSP IP/OBS MODERATE 35: CPT

## 2018-10-30 RX ADMIN — ENOXAPARIN SODIUM 40 MILLIGRAM(S): 100 INJECTION SUBCUTANEOUS at 17:47

## 2018-10-30 RX ADMIN — Medication 5 MILLIGRAM(S): at 17:47

## 2018-10-30 RX ADMIN — Medication 5 MILLIGRAM(S): at 12:59

## 2018-10-30 RX ADMIN — CARBIDOPA AND LEVODOPA 1 TABLET(S): 25; 100 TABLET ORAL at 05:17

## 2018-10-30 RX ADMIN — BUPROPION HYDROCHLORIDE 75 MILLIGRAM(S): 150 TABLET, EXTENDED RELEASE ORAL at 12:59

## 2018-10-30 RX ADMIN — Medication 75 MICROGRAM(S): at 05:17

## 2018-10-30 RX ADMIN — FINASTERIDE 5 MILLIGRAM(S): 5 TABLET, FILM COATED ORAL at 12:59

## 2018-10-30 RX ADMIN — CARBIDOPA AND LEVODOPA 1 TABLET(S): 25; 100 TABLET ORAL at 21:34

## 2018-10-30 RX ADMIN — Medication 5 MILLIGRAM(S): at 00:52

## 2018-10-30 RX ADMIN — DULOXETINE HYDROCHLORIDE 20 MILLIGRAM(S): 30 CAPSULE, DELAYED RELEASE ORAL at 12:59

## 2018-10-30 RX ADMIN — Medication 5 MILLIGRAM(S): at 06:14

## 2018-10-30 NOTE — PROGRESS NOTE ADULT - PROBLEM SELECTOR PLAN 5
hypophos, replace and monitor   -per family would still want blood draws every several weeks or monthly also would like electrolytes repleted if needed

## 2018-10-30 NOTE — GOALS OF CARE CONVERSATION - PERSONAL ADVANCE DIRECTIVE - CONVERSATION DETAILS
Met  with pt's spouse and dtr. [facility] POC and services discussed. Family would like LTC placement in a facility contracted with hospice services. They are interested in [facility] and also [facility]. [facility] is not contracted with [facility] but we are contracted with [facility]. The dtr states that they will be visiting [facility] this afternoon to tour and will make a decision if they will consider further. [facility] consents were signed in the interim. I left them with my contact info. They also have the [facility] LTC Facility list. We will cont to f/u as needed.       Jennifer Moise RN

## 2018-10-30 NOTE — PROGRESS NOTE ADULT - SUBJECTIVE AND OBJECTIVE BOX
Patient is a 83y old  Male who presents with a chief complaint of not eating (27 Oct 2018 14:10)      INTERVAL HPI/ OVERNIGHT EVENTS:   Pt was seen and examined at bedside.   No significant overnight events.     unable to examine due to [ ] Encephalopathy  [X ] Advamced Dementia  [ ] Expressive Aphasia  [ ] Non-verbal patient      MEDICATIONS  (STANDING):  buPROPion . 75 milliGRAM(s) Oral daily  carbidopa/levodopa  25/250 1 Tablet(s) Oral three times a day  dextrose 5%. 1000 milliLiter(s) (50 mL/Hr) IV Continuous <Continuous>  dextrose 50% Injectable 12.5 Gram(s) IV Push once  dextrose 50% Injectable 25 Gram(s) IV Push once  dextrose 50% Injectable 25 Gram(s) IV Push once  DULoxetine 20 milliGRAM(s) Oral daily  enoxaparin Injectable 40 milliGRAM(s) SubCutaneous every 24 hours  finasteride 5 milliGRAM(s) Oral daily  influenza   Vaccine 0.5 milliLiter(s) IntraMuscular once  insulin lispro (HumaLOG) corrective regimen sliding scale   SubCutaneous three times a day before meals  insulin lispro (HumaLOG) corrective regimen sliding scale   SubCutaneous at bedtime  levothyroxine 75 MICROGram(s) Oral daily  metoprolol tartrate Injectable 5 milliGRAM(s) IV Push every 6 hours  potassium phosphate IVPB 15 milliMole(s) IV Intermittent once    MEDICATIONS  (PRN):  dextrose 40% Gel 15 Gram(s) Oral once PRN Blood Glucose LESS THAN 70 milliGRAM(s)/deciliter  glucagon  Injectable 1 milliGRAM(s) IntraMuscular once PRN Glucose LESS THAN 70 milligrams/deciliter      Allergies:  No Known Allergies          Vitals stable.     PHYSICAL EXAM:  GENERAL: NAD, thin, well-groomed, opens eyes, does not follow commands   HEENT: NCAT, PERRLA, EOMI, MMM  CHEST/LUNG: Clear to Auscultation bilaterally; No rales, rhonchi, wheezing, or rubs  HEART: Regular rate and rhythm; No murmurs, rubs, or gallops  ABDOMEN: Soft, Nontender, Nondistended; +BS  EXTREMITIES: + diffuse stiff joints, 2+ Peripheral Pulses, No clubbing, cyanosis, or edema b/l   NERVOUS SYSTEM: non-verbal, moving all extremities       Labs and imaging reviewed.           Culture - Urine (collected 10-25-18)  Source: .Urine Clean Catch (Midstream)  Final Report (10-26-18):    No growth            Imaging Personally Reviewed:  [ ] YES  [ ] NO    Consultant(s) Notes Reviewed:  [x ] YES  [ ] NO    Care Discussed with Consultants/Other Providers [x ] YES  [ ] NO    Care discussed in detail with family at bedside.  All questions and concerns addressed

## 2018-10-31 ENCOUNTER — TRANSCRIPTION ENCOUNTER (OUTPATIENT)
Age: 83
End: 2018-10-31

## 2018-10-31 VITALS
TEMPERATURE: 98 F | OXYGEN SATURATION: 98 % | RESPIRATION RATE: 17 BRPM | HEART RATE: 61 BPM | DIASTOLIC BLOOD PRESSURE: 64 MMHG | SYSTOLIC BLOOD PRESSURE: 125 MMHG

## 2018-10-31 DIAGNOSIS — F03.90 UNSPECIFIED DEMENTIA WITHOUT BEHAVIORAL DISTURBANCE: ICD-10-CM

## 2018-10-31 DIAGNOSIS — E44.0 MODERATE PROTEIN-CALORIE MALNUTRITION: ICD-10-CM

## 2018-10-31 LAB
ANION GAP SERPL CALC-SCNC: 7 MMOL/L — SIGNIFICANT CHANGE UP (ref 5–17)
BUN SERPL-MCNC: 24 MG/DL — HIGH (ref 7–23)
CALCIUM SERPL-MCNC: 8.8 MG/DL — SIGNIFICANT CHANGE UP (ref 8.5–10.1)
CHLORIDE SERPL-SCNC: 109 MMOL/L — HIGH (ref 96–108)
CO2 SERPL-SCNC: 30 MMOL/L — SIGNIFICANT CHANGE UP (ref 22–31)
CREAT SERPL-MCNC: 0.78 MG/DL — SIGNIFICANT CHANGE UP (ref 0.5–1.3)
GLUCOSE SERPL-MCNC: 118 MG/DL — HIGH (ref 70–99)
MAGNESIUM SERPL-MCNC: 2.6 MG/DL — SIGNIFICANT CHANGE UP (ref 1.6–2.6)
PHOSPHATE SERPL-MCNC: 3.3 MG/DL — SIGNIFICANT CHANGE UP (ref 2.5–4.5)
POTASSIUM SERPL-MCNC: 4.6 MMOL/L — SIGNIFICANT CHANGE UP (ref 3.5–5.3)
POTASSIUM SERPL-SCNC: 4.6 MMOL/L — SIGNIFICANT CHANGE UP (ref 3.5–5.3)
SODIUM SERPL-SCNC: 146 MMOL/L — HIGH (ref 135–145)

## 2018-10-31 PROCEDURE — 99239 HOSP IP/OBS DSCHRG MGMT >30: CPT

## 2018-10-31 RX ORDER — SODIUM,POTASSIUM PHOSPHATES 278-250MG
2 POWDER IN PACKET (EA) ORAL
Qty: 0 | Refills: 0 | Status: DISCONTINUED | OUTPATIENT
Start: 2018-10-31 | End: 2018-10-31

## 2018-10-31 RX ADMIN — INFLUENZA VIRUS VACCINE 0.5 MILLILITER(S): 15; 15; 15; 15 SUSPENSION INTRAMUSCULAR at 18:07

## 2018-10-31 RX ADMIN — Medication 75 MICROGRAM(S): at 05:33

## 2018-10-31 RX ADMIN — Medication 5 MILLIGRAM(S): at 00:00

## 2018-10-31 RX ADMIN — ENOXAPARIN SODIUM 40 MILLIGRAM(S): 100 INJECTION SUBCUTANEOUS at 17:45

## 2018-10-31 RX ADMIN — Medication 5 MILLIGRAM(S): at 11:15

## 2018-10-31 RX ADMIN — CARBIDOPA AND LEVODOPA 1 TABLET(S): 25; 100 TABLET ORAL at 05:33

## 2018-10-31 RX ADMIN — Medication 5 MILLIGRAM(S): at 18:08

## 2018-10-31 RX ADMIN — Medication 5 MILLIGRAM(S): at 05:33

## 2018-10-31 NOTE — DISCHARGE NOTE ADULT - SECONDARY DIAGNOSIS.
Moderate protein-calorie malnutrition Parkinsons Type 2 diabetes mellitus without complication, without long-term current use of insulin Hyperlipidemia, unspecified hyperlipidemia type Electrolyte imbalance Acute cystitis with hematuria

## 2018-10-31 NOTE — DISCHARGE NOTE ADULT - CARE PLAN
Principal Discharge DX:	Failure to thrive in adult  Goal:	transition to hospice  Assessment and plan of treatment:	transition to hospice  Secondary Diagnosis:	Moderate protein-calorie malnutrition  Secondary Diagnosis:	Parkinsons  Secondary Diagnosis:	Type 2 diabetes mellitus without complication, without long-term current use of insulin  Secondary Diagnosis:	Hyperlipidemia, unspecified hyperlipidemia type  Secondary Diagnosis:	Electrolyte imbalance  Secondary Diagnosis:	Acute cystitis with hematuria

## 2018-10-31 NOTE — PROGRESS NOTE ADULT - PROVIDER SPECIALTY LIST ADULT
Cardiology
Hospitalist
Neurology
Palliative Care
Hospitalist

## 2018-10-31 NOTE — PROGRESS NOTE ADULT - PROBLEM SELECTOR PROBLEM 7
Electrolyte imbalance
Moderate protein-calorie malnutrition
Moderate protein-calorie malnutrition
Preventive measure
Moderate protein-calorie malnutrition
Preventive measure

## 2018-10-31 NOTE — DISCHARGE NOTE ADULT - MEDICATION SUMMARY - MEDICATIONS TO TAKE
I will START or STAY ON the medications listed below when I get home from the hospital:    finasteride 5 mg oral tablet  -- 1 tab(s) by mouth once a day  -- Indication: For bph    Ecotrin 325 mg oral delayed release tablet  -- 1 tab(s) by mouth once a day  -- Indication: For Preventive measure    tamsulosin 0.4 mg oral capsule  -- 1 cap(s) by mouth every other day  -- Indication: For Preventive measure    enoxaparin  -- 40 unit(s) subcutaneously once a day  -- Indication: For Preventive measure    Cymbalta 20 mg oral delayed release capsule  -- 1 cap(s) by mouth once a day  -- 1130AM  -- Indication: For Preventive measure    Crestor 10 mg oral tablet  -- 1 tab(s) by mouth once a day (at bedtime)  -- Indication: For Preventive measure    carbidopa-levodopa 25 mg-250 mg oral tablet  -- 1 tab(s) by mouth 3 times a day  -- Indication: For Parkinsons    Metoprolol Succinate ER 25 mg oral tablet, extended release  -- 1 tab(s) by mouth once a day  -- Indication: For Essential hypertension    bisacodyl 10 mg rectal suppository  -- 1 suppository(ies) rectally every 48 hours, As needed, Constipation  -- Indication: For Preventive measure    buPROPion 75 mg oral tablet  -- 1 tab(s) by mouth once a day  -- Indication: For Preventive measure    levothyroxine 50 mcg (0.05 mg) oral tablet  -- 1 tab(s) by mouth once a day  -- with orange juice  -- Indication: For Hypothyroid    Vitamin B Complex 100  --  injectable once a day  -- Indication: For Preventive measure

## 2018-10-31 NOTE — PROGRESS NOTE ADULT - PROBLEM SELECTOR PLAN 6
-awaiting hospice eval
-awaiting hospice eval, will be done today
Continue with FS monitoring with insulin s/s coverage.
Hypokalemia, hypophosphatemia: replace and monitor.
-awaiting hospice placement
Continue with FS monitoring with insulin s/s coverage.

## 2018-10-31 NOTE — PROGRESS NOTE ADULT - PROBLEM SELECTOR PLAN 7
-Nutrition eval appreciated. Dysphagia 1 Pureed-Honey Consistency Fluids, Lactovegetarian diet + Ensure Pudding 3x daily (510 kcals & 12 gm protein)
-Nutrition eval appreciated. Dysphagia 1 Pureed-Honey Consistency Fluids, Lactovegetarian diet + Ensure Pudding 3x daily (510 kcals & 12 gm protein)
Code Status: DNR/DNI  DVTp: Lovenox   Disposition: f/u Hospice consult
Hypokalemia, hypophosphatemia: replace and monitor.
-Nutrition eval appreciated. Dysphagia 1 Pureed-Honey Consistency Fluids, Lactovegetarian diet + Ensure Pudding 3x daily (510 kcals & 12 gm protein)
Code Status: DNR/DNI  DVTp: Lovenox   Disposition: f/u Hospice consult

## 2018-10-31 NOTE — DISCHARGE NOTE ADULT - HOSPITAL COURSE
83m with parkinsons who the family wants only comfort care and no ngt - he has not eaten in four days and requires admssion for hospice eval    -seen by Palliative NP, MOLST completed, patient DNR/DNI, awaiting hospice placement  -for now comfort care only.       45min spent on dc planning

## 2018-10-31 NOTE — PROGRESS NOTE ADULT - PROBLEM SELECTOR PLAN 1
-seen by Palliative NP, MOLST completed, patient DNR/DNI, awaiting hospice placement   -for now comfort care only

## 2018-10-31 NOTE — DISCHARGE NOTE ADULT - MEDICATION SUMMARY - MEDICATIONS TO STOP TAKING
I will STOP taking the medications listed below when I get home from the hospital:    Januvia 100 mg oral tablet  -- 1 tab(s) by mouth once a day    glyBURIDE-metFORMIN 5 mg-500 mg oral tablet  -- 1 tab(s) by mouth 2 times a day

## 2018-10-31 NOTE — PROGRESS NOTE ADULT - PROBLEM SELECTOR PROBLEM 6
Advance care planning
Advance care planning
Electrolyte imbalance
Type 2 diabetes mellitus without complication, without long-term current use of insulin
Advance care planning
Type 2 diabetes mellitus without complication, without long-term current use of insulin

## 2018-10-31 NOTE — DISCHARGE NOTE ADULT - PATIENT PORTAL LINK FT
You can access the [facility] Patient Portal, offered by [facility], by registering with the following website: [email or web address]

## 2018-10-31 NOTE — PROGRESS NOTE ADULT - SUBJECTIVE AND OBJECTIVE BOX
Patient is a 83y old  Male who presents with a chief complaint of not eating (30 Oct 2018 14:39)      INTERVAL HPI/OVERNIGHT EVENTS: no events     MEDICATIONS  (STANDING):  buPROPion . 75 milliGRAM(s) Oral daily  carbidopa/levodopa  25/250 1 Tablet(s) Oral three times a day  dextrose 5%. 1000 milliLiter(s) (50 mL/Hr) IV Continuous <Continuous>  dextrose 50% Injectable 12.5 Gram(s) IV Push once  dextrose 50% Injectable 25 Gram(s) IV Push once  dextrose 50% Injectable 25 Gram(s) IV Push once  DULoxetine 20 milliGRAM(s) Oral daily  enoxaparin Injectable 40 milliGRAM(s) SubCutaneous every 24 hours  finasteride 5 milliGRAM(s) Oral daily  influenza   Vaccine 0.5 milliLiter(s) IntraMuscular once  levothyroxine 75 MICROGram(s) Oral daily  metoprolol tartrate Injectable 5 milliGRAM(s) IV Push every 6 hours  potassium acid phosphate/sodium acid phosphate tablet (K-PHOS No. 2) 2 Tablet(s) Oral four times a day with meals    MEDICATIONS  (PRN):  dextrose 40% Gel 15 Gram(s) Oral once PRN Blood Glucose LESS THAN 70 milliGRAM(s)/deciliter  glucagon  Injectable 1 milliGRAM(s) IntraMuscular once PRN Glucose LESS THAN 70 milligrams/deciliter      Allergies    No Known Allergies    Intolerances             Vital Signs Last 24 Hrs  T(C): 37.2 (31 Oct 2018 11:30), Max: 37.2 (31 Oct 2018 11:30)  T(F): 98.9 (31 Oct 2018 11:30), Max: 98.9 (31 Oct 2018 11:30)  HR: 76 (31 Oct 2018 11:30) (67 - 76)  BP: 160/87 (31 Oct 2018 11:30) (135/72 - 160/87)  BP(mean): --  RR: 18 (31 Oct 2018 11:30) (16 - 18)  SpO2: 99% (31 Oct 2018 11:30) (97% - 99%)    PHYSICAL EXAM:  GENERAL: NAD, cachectic   HEENT: NCAT, PERRLA, EOMI, MMM  CHEST/LUNG: Clear to Auscultation bilaterally; No rales, rhonchi, wheezing, or rubs  HEART: Regular rate and rhythm; No murmurs, rubs, or gallops  ABDOMEN: Soft, Nontender, Nondistended; +BS  EXTREMITIES: + diffuse stiff joints, 2+ Peripheral Pulses, No clubbing, cyanosis, or edema b/l   NERVOUS SYSTEM: non-verbal, moving all extremities   LABS:    10-31    146<H>  |  109<H>  |  24<H>  ----------------------------<  118<H>  4.6   |  30  |  0.78    Ca    8.8      31 Oct 2018 10:04  Phos  3.3     10-31  Mg     2.6     10-31          CAPILLARY BLOOD GLUCOSE      POCT Blood Glucose.: 117 mg/dL (31 Oct 2018 07:35)  POCT Blood Glucose.: 145 mg/dL (30 Oct 2018 21:42)      RADIOLOGY & ADDITIONAL TESTS:    Imaging Personally Reviewed:  [ X] YES  [ ] NO    Consultant(s) Notes Reviewed:  [ X] YES  [ ] NO    Care Discussed with Consultants/Other Providers [X ] YES  [ ] NO

## 2018-10-31 NOTE — PROGRESS NOTE ADULT - ASSESSMENT
83m with parkinsons who the family wants only comfort care and no ngt - he has not eaten in four days and requires admssion for hospice eval
83m with parkinsons who the family wants only comfort care and no ngt - he has not eaten in four days and requires admssion for hospice eval       IMPROVE VTE Individual Risk Assessment        RISK                                                          Points  [  ] Previous VTE                                                3  [  ] Thrombophilia                                             2  [  ] Lower limb paralysis                                   2        (unable to hold up >15 seconds)    [  ] Current Cancer                                            2         (within 6 months)  [  x] Immobilization > 24 hrs                              1  [  ] ICU/CCU stay > 24 hours                            1  [ x ] Age > 60                                                    1  IMPROVE VTE Score ___2______
10/27/18, met with wife Dianne, and the rest of the family. MOLST form completed. DNR/ DNI, Comfort measures only, no tube feeding. Agreed for hospice care in a nursing facility. Hospice evaluation called in, awaiting hospice evaluation.
83m with parkinsons who the family wants only comfort care and no ngt - he has not eaten in four days and requires admssion for hospice eval
83m with parkinsons who the family wants only comfort care and no ngt - he has not eaten in four days and requires admssion for hospice eval       IMPROVE VTE Individual Risk Assessment        RISK                                                          Points  [  ] Previous VTE                                                3  [  ] Thrombophilia                                             2  [  ] Lower limb paralysis                                   2        (unable to hold up >15 seconds)    [  ] Current Cancer                                            2         (within 6 months)  [  x] Immobilization > 24 hrs                              1  [  ] ICU/CCU stay > 24 hours                            1  [ x ] Age > 60                                                    1  IMPROVE VTE Score ___2______
83m with parkinsons who the family wants only comfort care and no ngt - he has not eaten in four days and requires admssion for hospice eval       IMPROVE VTE Individual Risk Assessment        RISK                                                          Points  [  ] Previous VTE                                                3  [  ] Thrombophilia                                             2  [  ] Lower limb paralysis                                   2        (unable to hold up >15 seconds)    [  ] Current Cancer                                            2         (within 6 months)  [  x] Immobilization > 24 hrs                              1  [  ] ICU/CCU stay > 24 hours                            1  [ x ] Age > 60                                                    1  IMPROVE VTE Score ___2______
No acute CV issues  Will follow as needed
83m with parkinsons who the family wants only comfort care and no ngt - he has not eaten in four days and requires admssion for hospice eval

## 2018-11-02 DIAGNOSIS — Z71.89 OTHER SPECIFIED COUNSELING: ICD-10-CM

## 2018-11-06 DIAGNOSIS — E44.0 MODERATE PROTEIN-CALORIE MALNUTRITION: ICD-10-CM

## 2018-11-06 DIAGNOSIS — N30.91 CYSTITIS, UNSPECIFIED WITH HEMATURIA: ICD-10-CM

## 2018-11-06 DIAGNOSIS — F32.9 MAJOR DEPRESSIVE DISORDER, SINGLE EPISODE, UNSPECIFIED: ICD-10-CM

## 2018-11-06 DIAGNOSIS — E83.39 OTHER DISORDERS OF PHOSPHORUS METABOLISM: ICD-10-CM

## 2018-11-06 DIAGNOSIS — Z71.89 OTHER SPECIFIED COUNSELING: ICD-10-CM

## 2018-11-06 DIAGNOSIS — Z95.1 PRESENCE OF AORTOCORONARY BYPASS GRAFT: ICD-10-CM

## 2018-11-06 DIAGNOSIS — I25.10 ATHEROSCLEROTIC HEART DISEASE OF NATIVE CORONARY ARTERY WITHOUT ANGINA PECTORIS: ICD-10-CM

## 2018-11-06 DIAGNOSIS — E03.9 HYPOTHYROIDISM, UNSPECIFIED: ICD-10-CM

## 2018-11-06 DIAGNOSIS — G93.40 ENCEPHALOPATHY, UNSPECIFIED: ICD-10-CM

## 2018-11-06 DIAGNOSIS — R62.7 ADULT FAILURE TO THRIVE: ICD-10-CM

## 2018-11-06 DIAGNOSIS — E78.5 HYPERLIPIDEMIA, UNSPECIFIED: ICD-10-CM

## 2018-11-06 DIAGNOSIS — Z79.82 LONG TERM (CURRENT) USE OF ASPIRIN: ICD-10-CM

## 2018-11-06 DIAGNOSIS — Z66 DO NOT RESUSCITATE: ICD-10-CM

## 2018-11-06 DIAGNOSIS — G20 PARKINSON'S DISEASE: ICD-10-CM

## 2018-11-06 DIAGNOSIS — E87.6 HYPOKALEMIA: ICD-10-CM

## 2018-11-06 DIAGNOSIS — E11.9 TYPE 2 DIABETES MELLITUS WITHOUT COMPLICATIONS: ICD-10-CM

## 2018-11-06 DIAGNOSIS — Z51.5 ENCOUNTER FOR PALLIATIVE CARE: ICD-10-CM

## 2018-11-06 DIAGNOSIS — F02.80 DEMENTIA IN OTHER DISEASES CLASSIFIED ELSEWHERE, UNSPECIFIED SEVERITY, WITHOUT BEHAVIORAL DISTURBANCE, PSYCHOTIC DISTURBANCE, MOOD DISTURBANCE, AND ANXIETY: ICD-10-CM

## 2018-11-06 DIAGNOSIS — I10 ESSENTIAL (PRIMARY) HYPERTENSION: ICD-10-CM

## 2022-05-06 NOTE — ED PROVIDER NOTE - PHYSICAL EXAMINATION
Gen: Alert, + poor skin turgor  Head: NC, AT, PERRL, EOMI, normal lids/conjunctiva   ENT: normal hearing,   Neck: supple, no tenderness/meningismus/JVD   Pulm: Bilateral clear BS, normal resp effort, no wheeze/stridor/retractions  CV: RRR, no M/R/G, +dist pulses   Abd: soft, NT/ND, +BS, no guarding/rebound tenderness  Mskel: no edema/erythema/cyanosis   Skin: no rash   Neuro: awake, rigid Price (Use Numbers Only, No Special Characters Or $): 129 Price (Use Numbers Only, No Special Characters Or $): 241

## 2023-02-22 NOTE — PATIENT PROFILE ADULT - DO YOU FEEL UNSAFE AT WORK?
Problem: Discharge Planning  Goal: Discharge to home or other facility with appropriate resources  2/22/2023 1353 by Fabiana Farmer RN  Outcome: Progressing  2/22/2023 0337 by Latoya Christensen RN  Outcome: Progressing     Problem: Safety - Adult  Goal: Free from fall injury  2/22/2023 1353 by Fabiana Farmer RN  Outcome: Progressing  2/22/2023 0337 by Latoya Christensen RN  Outcome: Progressing     Problem: ABCDS Injury Assessment  Goal: Absence of physical injury  2/22/2023 1353 by Fabiana Farmer RN  Outcome: Progressing  2/22/2023 0337 by Latoya Christensen RN  Outcome: Progressing     Problem: Skin/Tissue Integrity  Goal: Absence of new skin breakdown  Description: 1. Monitor for areas of redness and/or skin breakdown  2. Assess vascular access sites hourly  3. Every 4-6 hours minimum:  Change oxygen saturation probe site  4. Every 4-6 hours:  If on nasal continuous positive airway pressure, respiratory therapy assess nares and determine need for appliance change or resting period.   2/22/2023 1353 by Fabiana Farmer RN  Outcome: Progressing  2/22/2023 0337 by Latoya Christensen RN  Outcome: Progressing     Problem: Chronic Conditions and Co-morbidities  Goal: Patient's chronic conditions and co-morbidity symptoms are monitored and maintained or improved  2/22/2023 1353 by Fabiana Farmer RN  Outcome: Progressing  2/22/2023 0337 by Latoya Christensen RN  Outcome: Progressing     Problem: Respiratory - Adult  Goal: Achieves optimal ventilation and oxygenation  2/22/2023 1353 by Fabiana Farmer RN  Outcome: Progressing  2/22/2023 0513 by Elise Gillette RCP  Flowsheets (Taken 2/22/2023 6450)  Achieves optimal ventilation and oxygenation:   Assess for changes in respiratory status   Position to facilitate oxygenation and minimize respiratory effort   Respiratory therapy support as indicated   Assess for changes in mentation and behavior   Oxygen supplementation based on oxygen saturation or arterial blood gases   Assess and instruct to report shortness of breath or any respiratory difficulty   Encourage broncho-pulmonary hygiene including cough, deep breathe, incentive spirometry  2/22/2023 0337 by Siri Bravo RN  Outcome: Progressing     Problem: Pain  Goal: Verbalizes/displays adequate comfort level or baseline comfort level  2/22/2023 1353 by Samuel Joyce RN  Outcome: Progressing  2/22/2023 0337 by Siri Bravo RN  Outcome: Progressing     Problem: Neurosensory - Adult  Goal: Achieves stable or improved neurological status  2/22/2023 1353 by Samuel Joyce RN  Outcome: Progressing  2/22/2023 0337 by Siri Bravo RN  Outcome: Progressing  Goal: Absence of seizures  2/22/2023 1353 by Samuel Joyce RN  Outcome: Progressing  2/22/2023 0337 by Siri Bravo RN  Outcome: Progressing  Goal: Remains free of injury related to seizures activity  2/22/2023 1353 by Samuel Joyce RN  Outcome: Progressing  2/22/2023 0337 by Siri Bravo RN  Outcome: Progressing  Goal: Achieves maximal functionality and self care  2/22/2023 1353 by Samuel Joyce RN  Outcome: Progressing  2/22/2023 0337 by Siri Bravo RN  Outcome: Progressing     Problem: Cardiovascular - Adult  Goal: Maintains optimal cardiac output and hemodynamic stability  2/22/2023 1353 by Samuel Joyce RN  Outcome: Progressing  2/22/2023 0337 by Siri Bravo RN  Outcome: Progressing  Goal: Absence of cardiac dysrhythmias or at baseline  2/22/2023 1353 by Samuel Joyce RN  Outcome: Progressing  2/22/2023 0337 by Siri Bravo RN  Outcome: Progressing     Problem: Skin/Tissue Integrity - Adult  Goal: Skin integrity remains intact  2/22/2023 1353 by Samuel Joyce RN  Outcome: Progressing  2/22/2023 0337 by Siri Bravo RN  Outcome: Progressing  Goal: Incisions, wounds, or drain sites healing without S/S of infection  2/22/2023 1353 by Samuel Joyce RN  Outcome: Progressing  2/22/2023 0337 by Siri Bravo RN  Outcome: Progressing  Goal: Oral mucous membranes remain intact  2/22/2023 1353 by Maxx Calhoun RN  Outcome: Progressing  2/22/2023 0337 by Farooq Farmer RN  Outcome: Progressing     Problem: Musculoskeletal - Adult  Goal: Return mobility to safest level of function  2/22/2023 1353 by Maxx Calhoun RN  Outcome: Progressing  2/22/2023 0337 by Farooq Farmer RN  Outcome: Progressing  Goal: Maintain proper alignment of affected body part  2/22/2023 1353 by Maxx Calhoun RN  Outcome: Progressing  2/22/2023 0337 by Farooq Farmer RN  Outcome: Progressing  Goal: Return ADL status to a safe level of function  2/22/2023 1353 by Maxx Calhoun RN  Outcome: Progressing  2/22/2023 0337 by Farooq Farmer RN  Outcome: Progressing     Problem: Gastrointestinal - Adult  Goal: Minimal or absence of nausea and vomiting  2/22/2023 1353 by Maxx Calhoun RN  Outcome: Progressing  2/22/2023 0337 by Farooq Farmer RN  Outcome: Progressing  Goal: Maintains or returns to baseline bowel function  2/22/2023 1353 by Maxx Calhoun RN  Outcome: Progressing  2/22/2023 0337 by Farooq Farmer RN  Outcome: Progressing  Goal: Maintains adequate nutritional intake  2/22/2023 1353 by Maxx Calhoun RN  Outcome: Progressing  2/22/2023 0337 by Farooq Farmer RN  Outcome: Progressing  Goal: Establish and maintain optimal ostomy function  2/22/2023 1353 by Maxx Calhoun RN  Outcome: Progressing  2/22/2023 0337 by Farooq Farmer RN  Outcome: Progressing     Problem: Genitourinary - Adult  Goal: Absence of urinary retention  2/22/2023 1353 by Maxx Calhoun RN  Outcome: Progressing  2/22/2023 0337 by Farooq Farmer RN  Outcome: Progressing  Goal: Urinary catheter remains patent  2/22/2023 1353 by Maxx Calhoun RN  Outcome: Progressing  2/22/2023 0337 by Farooq Farmer RN  Outcome: Progressing     Problem: Infection - Adult  Goal: Absence of infection at discharge  2/22/2023 1353 by Maxx Calhoun RN  Outcome: Progressing  2/22/2023 0337 by Farooq Farmer RN  Outcome: Progressing  Goal: Absence of infection during hospitalization  2/22/2023 1353 by Porter Diop RN  Outcome: Progressing  2/22/2023 0337 by Mick Mederos RN  Outcome: Progressing  Goal: Absence of fever/infection during anticipated neutropenic period  2/22/2023 1353 by Porter Diop RN  Outcome: Progressing  2/22/2023 0337 by Mick Mederos RN  Outcome: Progressing     Problem: Metabolic/Fluid and Electrolytes - Adult  Goal: Electrolytes maintained within normal limits  2/22/2023 1353 by Porter Diop RN  Outcome: Progressing  2/22/2023 0337 by Mick Mederos RN  Outcome: Progressing  Goal: Hemodynamic stability and optimal renal function maintained  2/22/2023 1353 by Porter Diop RN  Outcome: Progressing  2/22/2023 0337 by Mick Mederos RN  Outcome: Progressing  Goal: Glucose maintained within prescribed range  2/22/2023 1353 by Porter Diop RN  Outcome: Progressing  2/22/2023 0337 by Mick Mederos RN  Outcome: Progressing     Problem: Hematologic - Adult  Goal: Maintains hematologic stability  2/22/2023 1353 by Porter Diop RN  Outcome: Progressing  2/22/2023 0337 by Mick Mederos RN  Outcome: Progressing     Problem: Nutrition Deficit:  Goal: Optimize nutritional status  2/22/2023 1353 by Porter Diop RN  Outcome: Progressing  2/22/2023 0337 by Mick Mederos RN  Outcome: Progressing no

## 2024-07-10 NOTE — ED PROVIDER NOTE - NS ED MD DISPO DIVISION
Render In Strict Bullet Format?: No Continue Regimen: OTC Zyrtec take one at night\\nHydroxyzine take as needed for itch. Patient will continue to hold Leflunomide since Pruritus has improved. Detail Level: Zone [facility]

## 2024-11-22 NOTE — H&P ADULT - NSHPPOADEEPVENOUSTHROMB_GEN_A_CORE
Pt sts intermittent pain to left flank down to buttock, upper leg and into knee for the past several months. No known injury. Yesterday pain returned. Reports tingling to left foot.    no